# Patient Record
Sex: FEMALE | Race: BLACK OR AFRICAN AMERICAN | NOT HISPANIC OR LATINO
[De-identification: names, ages, dates, MRNs, and addresses within clinical notes are randomized per-mention and may not be internally consistent; named-entity substitution may affect disease eponyms.]

---

## 2017-01-04 ENCOUNTER — APPOINTMENT (OUTPATIENT)
Dept: BARIATRICS/WEIGHT MGMT | Facility: CLINIC | Age: 57
End: 2017-01-04

## 2017-01-17 ENCOUNTER — APPOINTMENT (OUTPATIENT)
Dept: BARIATRICS/WEIGHT MGMT | Facility: CLINIC | Age: 57
End: 2017-01-17

## 2017-01-30 ENCOUNTER — APPOINTMENT (OUTPATIENT)
Dept: OTOLARYNGOLOGY | Facility: CLINIC | Age: 57
End: 2017-01-30

## 2017-01-30 VITALS
DIASTOLIC BLOOD PRESSURE: 85 MMHG | WEIGHT: 293 LBS | HEIGHT: 64 IN | HEART RATE: 79 BPM | SYSTOLIC BLOOD PRESSURE: 133 MMHG | BODY MASS INDEX: 50.02 KG/M2

## 2017-01-30 DIAGNOSIS — H72.91 UNSPECIFIED PERFORATION OF TYMPANIC MEMBRANE, RIGHT EAR: ICD-10-CM

## 2017-01-30 DIAGNOSIS — Z86.79 PERSONAL HISTORY OF OTHER DISEASES OF THE CIRCULATORY SYSTEM: ICD-10-CM

## 2017-01-30 DIAGNOSIS — H90.2 CONDUCTIVE HEARING LOSS, UNSPECIFIED: ICD-10-CM

## 2017-01-30 DIAGNOSIS — Z86.39 PERSONAL HISTORY OF OTHER ENDOCRINE, NUTRITIONAL AND METABOLIC DISEASE: ICD-10-CM

## 2017-01-30 DIAGNOSIS — H72.2X1 OTHER MARGINAL PERFORATIONS OF TYMPANIC MEMBRANE, RIGHT EAR: ICD-10-CM

## 2017-01-31 PROBLEM — H90.2 CONDUCTIVE HEARING LOSS, TYMPANIC MEMBRANE: Status: ACTIVE | Noted: 2017-01-31

## 2017-01-31 PROBLEM — H72.2X1 MARGINAL PERFORATION OF TYMPANIC MEMBRANE OF RIGHT EAR: Status: ACTIVE | Noted: 2017-01-31

## 2017-02-27 ENCOUNTER — APPOINTMENT (OUTPATIENT)
Dept: OTOLARYNGOLOGY | Facility: CLINIC | Age: 57
End: 2017-02-27

## 2017-03-01 ENCOUNTER — APPOINTMENT (OUTPATIENT)
Dept: OTOLARYNGOLOGY | Facility: CLINIC | Age: 57
End: 2017-03-01

## 2017-03-08 ENCOUNTER — APPOINTMENT (OUTPATIENT)
Dept: OTOLARYNGOLOGY | Facility: CLINIC | Age: 57
End: 2017-03-08

## 2019-01-17 ENCOUNTER — OUTPATIENT (OUTPATIENT)
Dept: OUTPATIENT SERVICES | Facility: HOSPITAL | Age: 59
LOS: 1 days | End: 2019-01-17

## 2019-01-17 VITALS
OXYGEN SATURATION: 98 % | WEIGHT: 293 LBS | HEIGHT: 64 IN | DIASTOLIC BLOOD PRESSURE: 86 MMHG | TEMPERATURE: 97 F | RESPIRATION RATE: 14 BRPM | HEART RATE: 78 BPM | SYSTOLIC BLOOD PRESSURE: 138 MMHG

## 2019-01-17 DIAGNOSIS — I10 ESSENTIAL (PRIMARY) HYPERTENSION: ICD-10-CM

## 2019-01-17 DIAGNOSIS — N95.0 POSTMENOPAUSAL BLEEDING: ICD-10-CM

## 2019-01-17 DIAGNOSIS — R73.03 PREDIABETES: ICD-10-CM

## 2019-01-17 DIAGNOSIS — R06.83 SNORING: ICD-10-CM

## 2019-01-17 DIAGNOSIS — Z98.890 OTHER SPECIFIED POSTPROCEDURAL STATES: Chronic | ICD-10-CM

## 2019-01-17 LAB
ANION GAP SERPL CALC-SCNC: 13 MMO/L — SIGNIFICANT CHANGE UP (ref 7–14)
BUN SERPL-MCNC: 21 MG/DL — SIGNIFICANT CHANGE UP (ref 7–23)
CALCIUM SERPL-MCNC: 9.8 MG/DL — SIGNIFICANT CHANGE UP (ref 8.4–10.5)
CHLORIDE SERPL-SCNC: 96 MMOL/L — LOW (ref 98–107)
CO2 SERPL-SCNC: 30 MMOL/L — SIGNIFICANT CHANGE UP (ref 22–31)
CREAT SERPL-MCNC: 0.77 MG/DL — SIGNIFICANT CHANGE UP (ref 0.5–1.3)
GLUCOSE SERPL-MCNC: 89 MG/DL — SIGNIFICANT CHANGE UP (ref 70–99)
HBA1C BLD-MCNC: 5.8 % — HIGH (ref 4–5.6)
HCT VFR BLD CALC: 35.6 % — SIGNIFICANT CHANGE UP (ref 34.5–45)
HGB BLD-MCNC: 10.4 G/DL — LOW (ref 11.5–15.5)
MCHC RBC-ENTMCNC: 22.1 PG — LOW (ref 27–34)
MCHC RBC-ENTMCNC: 29.2 % — LOW (ref 32–36)
MCV RBC AUTO: 75.6 FL — LOW (ref 80–100)
NRBC # FLD: 0 K/UL — LOW (ref 25–125)
PLATELET # BLD AUTO: 264 K/UL — SIGNIFICANT CHANGE UP (ref 150–400)
PMV BLD: 10.8 FL — SIGNIFICANT CHANGE UP (ref 7–13)
POTASSIUM SERPL-MCNC: 3.7 MMOL/L — SIGNIFICANT CHANGE UP (ref 3.5–5.3)
POTASSIUM SERPL-SCNC: 3.7 MMOL/L — SIGNIFICANT CHANGE UP (ref 3.5–5.3)
RBC # BLD: 4.71 M/UL — SIGNIFICANT CHANGE UP (ref 3.8–5.2)
RBC # FLD: 18.1 % — HIGH (ref 10.3–14.5)
SODIUM SERPL-SCNC: 139 MMOL/L — SIGNIFICANT CHANGE UP (ref 135–145)
WBC # BLD: 7.74 K/UL — SIGNIFICANT CHANGE UP (ref 3.8–10.5)
WBC # FLD AUTO: 7.74 K/UL — SIGNIFICANT CHANGE UP (ref 3.8–10.5)

## 2019-01-17 RX ORDER — METFORMIN HYDROCHLORIDE 850 MG/1
1 TABLET ORAL
Qty: 0 | Refills: 0 | COMMUNITY

## 2019-01-17 RX ORDER — SODIUM CHLORIDE 9 MG/ML
1000 INJECTION, SOLUTION INTRAVENOUS
Qty: 0 | Refills: 0 | Status: DISCONTINUED | OUTPATIENT
Start: 2019-01-22 | End: 2019-02-06

## 2019-01-17 NOTE — H&P PST ADULT - RS GEN PE MLT RESP DETAILS PC
airway patent/respirations non-labored/no wheezes/breath sounds equal/no rales/clear to auscultation bilaterally/good air movement/no rhonchi

## 2019-01-17 NOTE — H&P PST ADULT - PROBLEM SELECTOR PLAN 3
Patient instructed to take Valsartan - Hydrochlorthiazide in the AM of surgery with a sip  of water.

## 2019-01-17 NOTE — H&P PST ADULT - NEGATIVE ENMT SYMPTOMS
no tinnitus/no vertigo/no sinus symptoms/no post-nasal discharge/no nose bleeds/no dysphagia/no ear pain/no nasal discharge/no nasal congestion/no nasal obstruction/no hearing difficulty/no recurrent cold sores/no abnormal taste sensation

## 2019-01-17 NOTE — H&P PST ADULT - NEGATIVE GENERAL GENITOURINARY SYMPTOMS
no flank pain R/no bladder infections/no incontinence/normal urinary frequency/no nocturia/no urine discoloration/no hematuria/no flank pain L/no dysuria/no urinary hesitancy

## 2019-01-17 NOTE — H&P PST ADULT - FAMILY HISTORY
Father  Still living? Unknown  Family history of hypertension, Age at diagnosis: Age Unknown  Family history of diabetes mellitus (DM), Age at diagnosis: Age Unknown     Mother  Still living? Yes, Estimated age: Age Unknown  Family history of hypertension, Age at diagnosis: Age Unknown  Family history of diabetes mellitus (DM), Age at diagnosis: Age Unknown

## 2019-01-17 NOTE — H&P PST ADULT - NEGATIVE ALLERGY TYPES
no reactions to food/no reactions to insect bites/no outdoor environmental allergies/no indoor environmental allergies/no reactions to medicines/no reactions to animals

## 2019-01-17 NOTE — H&P PST ADULT - HISTORY OF PRESENT ILLNESS
58 year old female with a history of Postmenopausal bleeding. Patient was referred to Dr. Robert for an evaluation. Pre op diagnosis: Postmenopausal bleeding. Patient is scheduled for Dilation curettage hysteroscopy scheduled on 1/22/2019.

## 2019-01-17 NOTE — H&P PST ADULT - NEGATIVE NEUROLOGICAL SYMPTOMS
no syncope/no loss of sensation/no difficulty walking/no loss of consciousness/no focal seizures/no headache/no transient paralysis/no paresthesias/no weakness/no generalized seizures/no tremors/no vertigo

## 2019-01-17 NOTE — H&P PST ADULT - NEGATIVE OPHTHALMOLOGIC SYMPTOMS
no discharge R/no blurred vision L/no blurred vision R/no diplopia/no irritation R/no pain R/no photophobia/no discharge L/no pain L/no irritation L

## 2019-01-17 NOTE — H&P PST ADULT - NEGATIVE GENERAL SYMPTOMS
no fever/no fatigue/no weight gain/no polyphagia/no polyuria/no chills/no sweating/no anorexia/no weight loss

## 2019-01-17 NOTE — H&P PST ADULT - NEGATIVE MUSCULOSKELETAL SYMPTOMS
no arm pain L/no arm pain R/no back pain/no leg pain L/no muscle cramps/no neck pain/no muscle weakness

## 2019-01-17 NOTE — H&P PST ADULT - PROBLEM SELECTOR PLAN 1
Patient is scheduled for Dilation curettage hysteroscopy scheduled on 1/22/2019.       Preop instructions, pepcid, surgical scrub provided. Pt stated understanding.    Pending labs. Patient is scheduled for Dilation curettage hysteroscopy scheduled on 1/22/2019.       Preop instructions, pepcid, surgical scrub provided. Pt stated understanding.  Pending Medical Evaluation per surgeon and PST- abnormal EKG -Dr. Ravinder Bagley - [phone]-[phone].    Pending labs.

## 2019-01-17 NOTE — H&P PST ADULT - ACTIVITY
Walking, climbing up and down stairs, gym 3 times a week for 60 minutes with a , cardio, weight lifting, house chores, ADLs, shopping

## 2019-01-17 NOTE — H&P PST ADULT - NEGATIVE GASTROINTESTINAL SYMPTOMS
no abdominal pain/no constipation/no melena/no diarrhea/no nausea/no vomiting/no change in bowel habits

## 2019-01-17 NOTE — H&P PST ADULT - ASSESSMENT
Pre op diagnosis: Postmenopausal bleeding. Patient is scheduled for Dilation curettage hysteroscopy scheduled on 1/22/2019.

## 2019-01-21 ENCOUNTER — TRANSCRIPTION ENCOUNTER (OUTPATIENT)
Age: 59
End: 2019-01-21

## 2019-01-22 ENCOUNTER — RESULT REVIEW (OUTPATIENT)
Age: 59
End: 2019-01-22

## 2019-01-22 ENCOUNTER — OUTPATIENT (OUTPATIENT)
Dept: OUTPATIENT SERVICES | Facility: HOSPITAL | Age: 59
LOS: 1 days | Discharge: HOME | End: 2019-01-22
Payer: COMMERCIAL

## 2019-01-22 VITALS
HEART RATE: 81 BPM | SYSTOLIC BLOOD PRESSURE: 155 MMHG | DIASTOLIC BLOOD PRESSURE: 68 MMHG | RESPIRATION RATE: 18 BRPM | WEIGHT: 293 LBS | TEMPERATURE: 98 F | HEIGHT: 64 IN | OXYGEN SATURATION: 96 %

## 2019-01-22 VITALS
OXYGEN SATURATION: 100 % | SYSTOLIC BLOOD PRESSURE: 155 MMHG | TEMPERATURE: 97 F | HEART RATE: 72 BPM | DIASTOLIC BLOOD PRESSURE: 61 MMHG | RESPIRATION RATE: 14 BRPM

## 2019-01-22 DIAGNOSIS — N95.0 POSTMENOPAUSAL BLEEDING: ICD-10-CM

## 2019-01-22 DIAGNOSIS — Z98.890 OTHER SPECIFIED POSTPROCEDURAL STATES: Chronic | ICD-10-CM

## 2019-01-22 LAB — GLUCOSE BLDC GLUCOMTR-MCNC: 117 MG/DL — HIGH (ref 70–99)

## 2019-01-22 PROCEDURE — 88305 TISSUE EXAM BY PATHOLOGIST: CPT | Mod: 26

## 2019-01-22 RX ORDER — SODIUM CHLORIDE 9 MG/ML
1000 INJECTION, SOLUTION INTRAVENOUS
Qty: 0 | Refills: 0 | Status: DISCONTINUED | OUTPATIENT
Start: 2019-01-22 | End: 2019-01-22

## 2019-01-22 RX ORDER — FENTANYL CITRATE 50 UG/ML
25 INJECTION INTRAVENOUS
Qty: 0 | Refills: 0 | Status: DISCONTINUED | OUTPATIENT
Start: 2019-01-22 | End: 2019-01-22

## 2019-01-22 RX ORDER — FENTANYL CITRATE 50 UG/ML
50 INJECTION INTRAVENOUS
Qty: 0 | Refills: 0 | Status: DISCONTINUED | OUTPATIENT
Start: 2019-01-22 | End: 2019-01-22

## 2019-01-22 RX ORDER — SODIUM CHLORIDE 9 MG/ML
1000 INJECTION, SOLUTION INTRAVENOUS
Qty: 0 | Refills: 0 | Status: DISCONTINUED | OUTPATIENT
Start: 2019-01-22 | End: 2019-02-06

## 2019-01-22 RX ORDER — ONDANSETRON 8 MG/1
4 TABLET, FILM COATED ORAL ONCE
Qty: 0 | Refills: 0 | Status: COMPLETED | OUTPATIENT
Start: 2019-01-22 | End: 2019-01-22

## 2019-01-22 RX ORDER — OXYCODONE HYDROCHLORIDE 5 MG/1
10 TABLET ORAL ONCE
Qty: 0 | Refills: 0 | Status: DISCONTINUED | OUTPATIENT
Start: 2019-01-22 | End: 2019-01-22

## 2019-01-22 RX ORDER — OXYCODONE HYDROCHLORIDE 5 MG/1
5 TABLET ORAL ONCE
Qty: 0 | Refills: 0 | Status: DISCONTINUED | OUTPATIENT
Start: 2019-01-22 | End: 2019-01-22

## 2019-01-22 RX ADMIN — SODIUM CHLORIDE 150 MILLILITER(S): 9 INJECTION, SOLUTION INTRAVENOUS at 09:19

## 2019-01-22 RX ADMIN — ONDANSETRON 4 MILLIGRAM(S): 8 TABLET, FILM COATED ORAL at 09:45

## 2019-01-22 NOTE — ASU DISCHARGE PLAN (ADULT/PEDIATRIC). - MEDICATION SUMMARY - MEDICATIONS TO TAKE
I will START or STAY ON the medications listed below when I get home from the hospital:    Motrin 600 mg oral tablet  -- 1 tab(s) by mouth every 6 hours, As Needed  -- Indication: For for pain/cramping, as needed    Tylenol 325 mg oral tablet  -- 3 tab(s) by mouth every 6 hours, As Needed  -- Indication: For for pain/cramping, as needed    metFORMIN 500 mg oral tablet  -- 1 tab(s) by mouth ( Patient states she does not take daily)   -- Indication: For home med    valsartan-hydrochlorothiazide 160mg-25mg oral tablet  -- 1 tab(s) by mouth once a day  -- Indication: For home med

## 2019-01-22 NOTE — PROGRESS NOTE ADULT - SUBJECTIVE AND OBJECTIVE BOX
Addenum to aneshtesia record:     For induction, patient got 300 mg propofol IV and 150 succinylcholine for weight of 320 lbs.

## 2019-01-22 NOTE — ASU DISCHARGE PLAN (ADULT/PEDIATRIC). - NOTIFY
Persistent Nausea and Vomiting/Bleeding that does not stop/GYN Fever>100.4/Inability to Tolerate Liquids or Foods/Pain not relieved by Medications

## 2019-01-22 NOTE — BRIEF OPERATIVE NOTE - OPERATION/FINDINGS
anteverted uterus, submucosal myoma on anterior uterine wall, uterine polyp on anterior left uterine wall

## 2019-01-22 NOTE — ASU DISCHARGE PLAN (ADULT/PEDIATRIC). - ITEMS TO FOLLOWUP WITH YOUR PHYSICIAN'S
Take Motrin and Tylenol as needed for pain and cramping.  Vaginal spotting for the next couple of days is normal.  If heavy vaginal bleeding, foul smelling discharge, fevers, or pain not controlled with oral pain meds, please contact your provider or go to the emergency room.  Nothing in the vagina for the next two weeks. Please call Dr. Robert's office for a 2 week follow-up.

## 2019-01-22 NOTE — ASU DISCHARGE PLAN (ADULT/PEDIATRIC). - NURSING INSTRUCTIONS
You received intravenous toradol (NSAID) in the operating room at 8:45am You may take additional NSAIDs (advil/ibuprofen/motrin) after 2:45pm.

## 2019-01-22 NOTE — BRIEF OPERATIVE NOTE - PROCEDURE
<<-----Click on this checkbox to enter Procedure Diagnostic hysteroscopy  01/22/2019    Active  [id number]  Dilation and curettage  01/22/2019    Active  [id number]

## 2019-01-24 LAB — SURGICAL PATHOLOGY STUDY: SIGNIFICANT CHANGE UP

## 2019-05-03 ENCOUNTER — RESULT REVIEW (OUTPATIENT)
Age: 59
End: 2019-05-03

## 2019-06-21 ENCOUNTER — OUTPATIENT (OUTPATIENT)
Dept: OUTPATIENT SERVICES | Facility: HOSPITAL | Age: 59
LOS: 1 days | End: 2019-06-21
Payer: COMMERCIAL

## 2019-06-21 VITALS
HEART RATE: 66 BPM | WEIGHT: 293 LBS | DIASTOLIC BLOOD PRESSURE: 88 MMHG | HEIGHT: 64 IN | RESPIRATION RATE: 16 BRPM | SYSTOLIC BLOOD PRESSURE: 110 MMHG | TEMPERATURE: 97 F | OXYGEN SATURATION: 96 %

## 2019-06-21 DIAGNOSIS — N95.0 POSTMENOPAUSAL BLEEDING: ICD-10-CM

## 2019-06-21 DIAGNOSIS — R06.83 SNORING: ICD-10-CM

## 2019-06-21 DIAGNOSIS — N95.0 POSTMENOPAUSAL BLEEDING: Chronic | ICD-10-CM

## 2019-06-21 DIAGNOSIS — Z98.890 OTHER SPECIFIED POSTPROCEDURAL STATES: Chronic | ICD-10-CM

## 2019-06-21 DIAGNOSIS — R73.03 PREDIABETES: ICD-10-CM

## 2019-06-21 LAB
ANION GAP SERPL CALC-SCNC: 15 MMO/L — HIGH (ref 7–14)
BUN SERPL-MCNC: 8 MG/DL — SIGNIFICANT CHANGE UP (ref 7–23)
CALCIUM SERPL-MCNC: 9.2 MG/DL — SIGNIFICANT CHANGE UP (ref 8.4–10.5)
CHLORIDE SERPL-SCNC: 101 MMOL/L — SIGNIFICANT CHANGE UP (ref 98–107)
CO2 SERPL-SCNC: 24 MMOL/L — SIGNIFICANT CHANGE UP (ref 22–31)
CREAT SERPL-MCNC: 0.61 MG/DL — SIGNIFICANT CHANGE UP (ref 0.5–1.3)
GLUCOSE SERPL-MCNC: 82 MG/DL — SIGNIFICANT CHANGE UP (ref 70–99)
HBA1C BLD-MCNC: 6.2 % — HIGH (ref 4–5.6)
HCT VFR BLD CALC: 35 % — SIGNIFICANT CHANGE UP (ref 34.5–45)
HGB BLD-MCNC: 10.2 G/DL — LOW (ref 11.5–15.5)
MCHC RBC-ENTMCNC: 23.3 PG — LOW (ref 27–34)
MCHC RBC-ENTMCNC: 29.1 % — LOW (ref 32–36)
MCV RBC AUTO: 79.9 FL — LOW (ref 80–100)
NRBC # FLD: 0 K/UL — SIGNIFICANT CHANGE UP (ref 0–0)
PLATELET # BLD AUTO: 230 K/UL — SIGNIFICANT CHANGE UP (ref 150–400)
PMV BLD: 10.8 FL — SIGNIFICANT CHANGE UP (ref 7–13)
POTASSIUM SERPL-MCNC: 4.2 MMOL/L — SIGNIFICANT CHANGE UP (ref 3.5–5.3)
POTASSIUM SERPL-SCNC: 4.2 MMOL/L — SIGNIFICANT CHANGE UP (ref 3.5–5.3)
RBC # BLD: 4.38 M/UL — SIGNIFICANT CHANGE UP (ref 3.8–5.2)
RBC # FLD: 18.7 % — HIGH (ref 10.3–14.5)
SODIUM SERPL-SCNC: 140 MMOL/L — SIGNIFICANT CHANGE UP (ref 135–145)
WBC # BLD: 6.87 K/UL — SIGNIFICANT CHANGE UP (ref 3.8–10.5)
WBC # FLD AUTO: 6.87 K/UL — SIGNIFICANT CHANGE UP (ref 3.8–10.5)

## 2019-06-21 PROCEDURE — 93010 ELECTROCARDIOGRAM REPORT: CPT

## 2019-06-21 RX ORDER — SODIUM CHLORIDE 9 MG/ML
1000 INJECTION, SOLUTION INTRAVENOUS
Refills: 0 | Status: DISCONTINUED | OUTPATIENT
Start: 2019-07-01 | End: 2019-07-16

## 2019-06-21 RX ORDER — IBUPROFEN 200 MG
1 TABLET ORAL
Qty: 0 | Refills: 0 | DISCHARGE

## 2019-06-21 RX ORDER — METFORMIN HYDROCHLORIDE 850 MG/1
1 TABLET ORAL
Qty: 0 | Refills: 0 | DISCHARGE

## 2019-06-21 RX ORDER — ACETAMINOPHEN 500 MG
3 TABLET ORAL
Qty: 0 | Refills: 0 | DISCHARGE

## 2019-06-21 NOTE — H&P PST ADULT - NSICDXPASTMEDICALHX_GEN_ALL_CORE_FT
PAST MEDICAL HISTORY:  Hypertension     Obesity     Postmenopausal bleeding D&C in   Jan. 2019    Prediabetes PAST MEDICAL HISTORY:  Hearing loss ruptured right tympanic membrane    Hypertension     Obesity     Postmenopausal bleeding D&C in   Jan. 2019    Prediabetes     Snoring vanna precautions -- responds affirmatively to STOP BANG questionnaire

## 2019-06-21 NOTE — H&P PST ADULT - ACTIVITY
Walking, climbing up and down stairs, gym 3 times a week for 60 minutes with a , cardio, weight lifting, house chores, ADLs, shopping can climb stairs without SOB

## 2019-06-21 NOTE — H&P PST ADULT - ENMT COMMENTS
ruptured right eardrum with slight decreased hearing known right ruptured ear drum known right ruptured ear drum  with decreased hearing

## 2019-06-21 NOTE — H&P PST ADULT - NSICDXPROBLEM_GEN_ALL_CORE_FT
PROBLEM DIAGNOSES  Problem: Postmenopausal bleeding  Assessment and Plan: This is a  59 y/o female who is scheduled for D&C, hysteroscopy on 7-1-19  * Given preop instructions    Problem: Snoring  Assessment and Plan: Notified OR booking via fax of vanna precautions     Problem: Prediabetes  Assessment and Plan: Instructed to stop Metformin 24 hours before surgery PROBLEM DIAGNOSES  Problem: Postmenopausal bleeding  Assessment and Plan: This is a  57 y/o female who is scheduled for D&C, hysteroscopy on 7-1-19  * Given preop instructions  * Instructed to take normal am dose of losartan - HCTZ, and medroxyprogesterone the am of surgery    Problem: Snoring  Assessment and Plan: Notified OR booking via fax of vanna precautions     Problem: Prediabetes  Assessment and Plan: Instructed to stop Metformin 24 hours before surgery PROBLEM DIAGNOSES  Problem: Postmenopausal bleeding  Assessment and Plan: This is a  59 y/o female who is scheduled for D&C, hysteroscopy on 7-1-19  * Given preop instructions  * Instructed to take normal am dose of losartan - HCTZ, and medroxyprogesterone the am of surgery    Problem: Snoring  Assessment and Plan: Notified OR booking via fax of vanna precautions     Problem: Prediabetes  Assessment and Plan: Instructed to stop Metformin 24 hours before surgery  * Comparison ekg on chart  * Await stress test and echo from pcp

## 2019-06-21 NOTE — H&P PST ADULT - HISTORY OF PRESENT ILLNESS
This is a 58 year old female with a history of Postmenopausal bleeding. Patient underwent.  Dilation curettage hysteroscopy scheduled on 1/22/2019. Pathology revealed abnormal cells, as well as  a polyp. Placed on hormonal therapy with subsequent benign endometrial biopsies. Postmenopausal bleeding continued. Intervention recommended. Scheduled for D&C, hysteroscopy on 7-1-19

## 2019-06-21 NOTE — H&P PST ADULT - NSICDXPASTSURGICALHX_GEN_ALL_CORE_FT
PAST SURGICAL HISTORY:  History of surgery Left knee surgery in 2007    Postmenopausal bleeding PAST SURGICAL HISTORY:  History of elective      History of surgery Left knee surgery in 2007    Postmenopausal bleeding

## 2019-06-21 NOTE — H&P PST ADULT - NSICDXFAMILYHX_GEN_ALL_CORE_FT
FAMILY HISTORY:  Father  Still living? Unknown  Family history of diabetes mellitus (DM), Age at diagnosis: Age Unknown  Family history of hypertension, Age at diagnosis: Age Unknown    Mother  Still living? Yes, Estimated age: Age Unknown  Family history of diabetes mellitus (DM), Age at diagnosis: Age Unknown  Family history of hypertension, Age at diagnosis: Age Unknown

## 2019-06-28 NOTE — ASU PATIENT PROFILE, ADULT - VISION (WITH CORRECTIVE LENSES IF THE PATIENT USUALLY WEARS THEM):
Partially impaired: cannot see medication labels or newsprint, but can see obstacles in path, and the surrounding layout; can count fingers at arm's length Partially impaired: cannot see medication labels or newsprint, but can see obstacles in path, and the surrounding layout; can count fingers at arm's length/glasses

## 2019-06-28 NOTE — ASU PATIENT PROFILE, ADULT - PMH
Hearing loss  ruptured right tympanic membrane  Hypertension    Obesity    Postmenopausal bleeding  D&C in   Jan. 2019  Prediabetes    Snoring  vanna precautions -- responds affirmatively to STOP BANG questionnaire

## 2019-06-30 ENCOUNTER — TRANSCRIPTION ENCOUNTER (OUTPATIENT)
Age: 59
End: 2019-06-30

## 2019-07-01 ENCOUNTER — RESULT REVIEW (OUTPATIENT)
Age: 59
End: 2019-07-01

## 2019-07-01 ENCOUNTER — OUTPATIENT (OUTPATIENT)
Dept: OUTPATIENT SERVICES | Facility: HOSPITAL | Age: 59
LOS: 1 days | Discharge: HOME | End: 2019-07-01
Payer: COMMERCIAL

## 2019-07-01 VITALS
RESPIRATION RATE: 17 BRPM | DIASTOLIC BLOOD PRESSURE: 69 MMHG | OXYGEN SATURATION: 98 % | SYSTOLIC BLOOD PRESSURE: 162 MMHG | HEIGHT: 64 IN | HEART RATE: 66 BPM | TEMPERATURE: 98 F | WEIGHT: 293 LBS

## 2019-07-01 VITALS
SYSTOLIC BLOOD PRESSURE: 156 MMHG | RESPIRATION RATE: 16 BRPM | TEMPERATURE: 98 F | DIASTOLIC BLOOD PRESSURE: 68 MMHG | OXYGEN SATURATION: 100 % | HEART RATE: 66 BPM

## 2019-07-01 DIAGNOSIS — Z98.890 OTHER SPECIFIED POSTPROCEDURAL STATES: Chronic | ICD-10-CM

## 2019-07-01 DIAGNOSIS — N95.0 POSTMENOPAUSAL BLEEDING: Chronic | ICD-10-CM

## 2019-07-01 DIAGNOSIS — N95.0 POSTMENOPAUSAL BLEEDING: ICD-10-CM

## 2019-07-01 LAB — GLUCOSE BLDC GLUCOMTR-MCNC: 92 MG/DL — SIGNIFICANT CHANGE UP (ref 70–99)

## 2019-07-01 PROCEDURE — 88305 TISSUE EXAM BY PATHOLOGIST: CPT | Mod: 26

## 2019-07-01 RX ORDER — FENTANYL CITRATE 50 UG/ML
25 INJECTION INTRAVENOUS
Refills: 0 | Status: DISCONTINUED | OUTPATIENT
Start: 2019-07-01 | End: 2019-07-01

## 2019-07-01 RX ORDER — FENTANYL CITRATE 50 UG/ML
50 INJECTION INTRAVENOUS
Refills: 0 | Status: DISCONTINUED | OUTPATIENT
Start: 2019-07-01 | End: 2019-07-01

## 2019-07-01 RX ORDER — ONDANSETRON 8 MG/1
4 TABLET, FILM COATED ORAL ONCE
Refills: 0 | Status: DISCONTINUED | OUTPATIENT
Start: 2019-07-01 | End: 2019-07-16

## 2019-07-01 NOTE — BRIEF OPERATIVE NOTE - NSICDXBRIEFPROCEDURE_GEN_ALL_CORE_FT
PROCEDURES:  D&C (dilatation and curettage, scraping of uterus) 01-Jul-2019 11:35:50  Deepak Ragsdale

## 2019-07-01 NOTE — ASU DISCHARGE PLAN (ADULT/PEDIATRIC) - CARE PROVIDER_API CALL
Rosario Robert)  Obstetrics and Gynecology  [address], [address]  [place] [address]  Phone: [phone]  Fax: [phone]  Follow Up Time:

## 2019-07-01 NOTE — ASU DISCHARGE PLAN (ADULT/PEDIATRIC) - CALL YOUR DOCTOR IF YOU HAVE ANY OF THE FOLLOWING:
Swelling that gets worse/Unable to urinate/Increased irritability or sluggishness/Nausea and vomiting that does not stop/Bleeding that does not stop/Wound/Surgical Site with redness, or foul smelling discharge or pus/Fever greater than (need to indicate Fahrenheit or Celsius)/Numbness, tingling, color or temperature change to extremity/Pain not relieved by Medications/Inability to tolerate liquids or foods/Excessive diarrhea

## 2019-07-01 NOTE — ASU DISCHARGE PLAN (ADULT/PEDIATRIC) - NURSING INSTRUCTIONS
No heavy lifting or straining. If excessive bleeding that does not stop, notify MD. If fever over 100.4 notify MD. Follow up with MD. DO NOT take any Tylenol (Acetaminophen) or narcotics containing Tylenol until after  ___525 pm___ . You received Tylenol during your operation and it can cause damage to your liver if too much is taken within a 24 hour time period. No motrin before 6:30 pm

## 2019-09-18 ENCOUNTER — RESULT REVIEW (OUTPATIENT)
Age: 59
End: 2019-09-18

## 2019-09-25 PROBLEM — E66.9 OBESITY, UNSPECIFIED: Chronic | Status: ACTIVE | Noted: 2019-06-21

## 2019-10-03 ENCOUNTER — APPOINTMENT (OUTPATIENT)
Dept: OBGYN | Facility: CLINIC | Age: 59
End: 2019-10-03
Payer: COMMERCIAL

## 2019-10-03 PROCEDURE — 99243 OFF/OP CNSLTJ NEW/EST LOW 30: CPT

## 2019-10-04 ENCOUNTER — OUTPATIENT (OUTPATIENT)
Dept: OUTPATIENT SERVICES | Facility: HOSPITAL | Age: 59
LOS: 1 days | End: 2019-10-04
Payer: COMMERCIAL

## 2019-10-04 ENCOUNTER — APPOINTMENT (OUTPATIENT)
Dept: ULTRASOUND IMAGING | Facility: CLINIC | Age: 59
End: 2019-10-04
Payer: COMMERCIAL

## 2019-10-04 DIAGNOSIS — Z98.890 OTHER SPECIFIED POSTPROCEDURAL STATES: Chronic | ICD-10-CM

## 2019-10-04 DIAGNOSIS — Z00.8 ENCOUNTER FOR OTHER GENERAL EXAMINATION: ICD-10-CM

## 2019-10-04 DIAGNOSIS — N95.0 POSTMENOPAUSAL BLEEDING: Chronic | ICD-10-CM

## 2019-10-04 PROCEDURE — 76856 US EXAM PELVIC COMPLETE: CPT

## 2019-10-04 PROCEDURE — 76830 TRANSVAGINAL US NON-OB: CPT

## 2019-10-04 PROCEDURE — 76830 TRANSVAGINAL US NON-OB: CPT | Mod: 26

## 2019-10-04 PROCEDURE — 76856 US EXAM PELVIC COMPLETE: CPT | Mod: 26,59

## 2019-10-15 ENCOUNTER — TRANSCRIPTION ENCOUNTER (OUTPATIENT)
Age: 59
End: 2019-10-15

## 2019-11-11 ENCOUNTER — OUTPATIENT (OUTPATIENT)
Dept: OUTPATIENT SERVICES | Facility: HOSPITAL | Age: 59
LOS: 1 days | End: 2019-11-11
Payer: COMMERCIAL

## 2019-11-11 VITALS
TEMPERATURE: 98 F | DIASTOLIC BLOOD PRESSURE: 90 MMHG | WEIGHT: 293 LBS | RESPIRATION RATE: 16 BRPM | HEIGHT: 63.25 IN | SYSTOLIC BLOOD PRESSURE: 150 MMHG | HEART RATE: 67 BPM | OXYGEN SATURATION: 98 %

## 2019-11-11 DIAGNOSIS — G47.33 OBSTRUCTIVE SLEEP APNEA (ADULT) (PEDIATRIC): ICD-10-CM

## 2019-11-11 DIAGNOSIS — Z98.890 OTHER SPECIFIED POSTPROCEDURAL STATES: Chronic | ICD-10-CM

## 2019-11-11 DIAGNOSIS — N95.0 POSTMENOPAUSAL BLEEDING: ICD-10-CM

## 2019-11-11 DIAGNOSIS — N95.0 POSTMENOPAUSAL BLEEDING: Chronic | ICD-10-CM

## 2019-11-11 DIAGNOSIS — I10 ESSENTIAL (PRIMARY) HYPERTENSION: ICD-10-CM

## 2019-11-11 DIAGNOSIS — Z01.818 ENCOUNTER FOR OTHER PREPROCEDURAL EXAMINATION: ICD-10-CM

## 2019-11-11 DIAGNOSIS — E11.9 TYPE 2 DIABETES MELLITUS WITHOUT COMPLICATIONS: ICD-10-CM

## 2019-11-11 LAB
ANION GAP SERPL CALC-SCNC: 15 MMO/L — HIGH (ref 7–14)
BLD GP AB SCN SERPL QL: NEGATIVE — SIGNIFICANT CHANGE UP
BUN SERPL-MCNC: 8 MG/DL — SIGNIFICANT CHANGE UP (ref 7–23)
CALCIUM SERPL-MCNC: 9.7 MG/DL — SIGNIFICANT CHANGE UP (ref 8.4–10.5)
CHLORIDE SERPL-SCNC: 96 MMOL/L — LOW (ref 98–107)
CO2 SERPL-SCNC: 28 MMOL/L — SIGNIFICANT CHANGE UP (ref 22–31)
CREAT SERPL-MCNC: 0.71 MG/DL — SIGNIFICANT CHANGE UP (ref 0.5–1.3)
GLUCOSE SERPL-MCNC: 104 MG/DL — HIGH (ref 70–99)
HBA1C BLD-MCNC: 6.1 % — HIGH (ref 4–5.6)
HCT VFR BLD CALC: 38 % — SIGNIFICANT CHANGE UP (ref 34.5–45)
HGB BLD-MCNC: 11.4 G/DL — LOW (ref 11.5–15.5)
MCHC RBC-ENTMCNC: 24 PG — LOW (ref 27–34)
MCHC RBC-ENTMCNC: 30 % — LOW (ref 32–36)
MCV RBC AUTO: 80 FL — SIGNIFICANT CHANGE UP (ref 80–100)
NRBC # FLD: 0 K/UL — SIGNIFICANT CHANGE UP (ref 0–0)
PLATELET # BLD AUTO: 255 K/UL — SIGNIFICANT CHANGE UP (ref 150–400)
PMV BLD: 11.3 FL — SIGNIFICANT CHANGE UP (ref 7–13)
POTASSIUM SERPL-MCNC: 3.7 MMOL/L — SIGNIFICANT CHANGE UP (ref 3.5–5.3)
POTASSIUM SERPL-SCNC: 3.7 MMOL/L — SIGNIFICANT CHANGE UP (ref 3.5–5.3)
RBC # BLD: 4.75 M/UL — SIGNIFICANT CHANGE UP (ref 3.8–5.2)
RBC # FLD: 16 % — HIGH (ref 10.3–14.5)
RH IG SCN BLD-IMP: POSITIVE — SIGNIFICANT CHANGE UP
SODIUM SERPL-SCNC: 139 MMOL/L — SIGNIFICANT CHANGE UP (ref 135–145)
WBC # BLD: 6.24 K/UL — SIGNIFICANT CHANGE UP (ref 3.8–10.5)
WBC # FLD AUTO: 6.24 K/UL — SIGNIFICANT CHANGE UP (ref 3.8–10.5)

## 2019-11-11 PROCEDURE — 93010 ELECTROCARDIOGRAM REPORT: CPT

## 2019-11-11 RX ORDER — MEDROXYPROGESTERONE ACETATE 150 MG/ML
1 INJECTION, SUSPENSION, EXTENDED RELEASE INTRAMUSCULAR
Qty: 0 | Refills: 0 | DISCHARGE

## 2019-11-11 NOTE — H&P PST ADULT - NEGATIVE NEUROLOGICAL SYMPTOMS
no paresthesias/no focal seizures/no generalized seizures/no transient paralysis/no weakness/no headache

## 2019-11-11 NOTE — H&P PST ADULT - NSANTHOSAYNRD_GEN_A_CORE
No. SARAH screening performed.  STOP BANG Legend: 0-2 = LOW Risk; 3-4 = INTERMEDIATE Risk; 5-8 = HIGH Risk/pt repots h/o snoring pt denies at present pt reports h/o snoring pt denies at present/No. SARAH screening performed.  STOP BANG Legend: 0-2 = LOW Risk; 3-4 = INTERMEDIATE Risk; 5-8 = HIGH Risk

## 2019-11-11 NOTE — H&P PST ADULT - ATTENDING COMMENTS
pt with PMB and prior hyperplasia and s/p benign d/c for tlh bso the risks and alternatives have been discussed.

## 2019-11-11 NOTE — H&P PST ADULT - NSICDXPROBLEM_GEN_ALL_CORE_FT
PROBLEM DIAGNOSES  Problem: Postmenopausal bleeding  Assessment and Plan: Robotic Assisted Laparoscopic Hysterectomy, Bilateral Salpingo OOpherectomy  Possible Exploratory Laparotomy   Pre op instructions including Pepcid and Hibiclens with teach back eviewed with pt ; pt verbalized good understanding of pre op instructions  Pt to Dr Bagley for pre op medical evaluation       Problem: Hypertension  Assessment and Plan: Pt to take Valsartan dos  Pt to see Dr Bagley pre op for Evaluation of Blood pressure and medical Clearance      Problem: Diabetes mellitus  Assessment and Plan: BMP and HGBA1C done 11/11/19   Pt to hold Metfromin evening prior to surgery and dos  FS dos     Problem: SARAH (obstructive sleep apnea)  Assessment and Plan: SARAH Precautions  OR booking notified via fax PROBLEM DIAGNOSES  Problem: Postmenopausal bleeding  Assessment and Plan: Robotic Assisted Laparoscopic Hysterectomy, Bilateral Salpingo OOpherectomy  Possible Exploratory Laparotomy   Pre op instructions including Pepcid and Hibiclens with teach back reviewed with pt ; pt verbalized good understanding of pre op instructions  Pt to Dr Bagley for pre op medical evaluation       Problem: Hypertension  Assessment and Plan: Pt to take Valsartan dos  Pt to see Dr Bagley pre op for Evaluation of Blood pressure and medical Clearance      Problem: Diabetes mellitus  Assessment and Plan: BMP and HGBA1C done 11/11/19   Pt to hold Metfromin evening prior to surgery and dos  FS dos     Problem: SARAH (obstructive sleep apnea)  Assessment and Plan: SARAH Precautions  OR booking notified via fax

## 2019-11-11 NOTE — H&P PST ADULT - NSICDXPASTSURGICALHX_GEN_ALL_CORE_FT
PAST SURGICAL HISTORY:  History of elective      History of surgery Left knee surgery in     Postmenopausal bleeding s/p D&C Hysteroscopy ,

## 2019-11-11 NOTE — H&P PST ADULT - NSICDXPASTMEDICALHX_GEN_ALL_CORE_FT
PAST MEDICAL HISTORY:  Hearing loss ruptured right tympanic membrane    Hypertension     Obesity     Postmenopausal bleeding D&C in   Jan. 2019, June 2019    Prediabetes     Snoring vanna precautions -- responds affirmatively to STOP BANG questionnaire

## 2019-11-11 NOTE — H&P PST ADULT - NSANTHAGERD_ENT_A_CORE
PPH Schizoaffective Disorder & Cannabis Use Disorder. He has a history of multiple inpatient admissions discharged most recently from [facility] 8/24/18-10/11/18 and [facility] inpatient 11/17/17-8/8/18. . Patient was inpatient [facility] stay in 2015 until 10/25/17 but then was discharged and readmitted after medication non compliance until 8/8/18. He is currently on AOT. Yes

## 2019-11-11 NOTE — H&P PST ADULT - HISTORY OF PRESENT ILLNESS
This is a 58 year old female with a history of Postmenopausal bleeding. Patient s/p  Dilation curettage hysteroscopy scheduled on 1/22/2019. Pathology revealed abnormal cells, as well as  a polyp. Pt states  postmenopausal bleeding continued, pt s/p   D&C, hysteroscopy on 7-1-19. Pt reports reoccurrence of bleeding ; pt f/u with surgeon ; pt now presents for Robotic Assisted laparoscopic Hysterectomy  Bilateral Salpingo Oophorectomy Possible Exploratory laparotomy

## 2019-11-11 NOTE — H&P PST ADULT - ENMT COMMENTS
ruptured right eardrum with slight decreased hearing known right ruptured ear drum  with decreased hearing

## 2019-11-26 ENCOUNTER — TRANSCRIPTION ENCOUNTER (OUTPATIENT)
Age: 59
End: 2019-11-26

## 2019-11-26 NOTE — ASU PATIENT PROFILE, ADULT - PMH
Hearing loss  ruptured right tympanic membrane  Hypertension    Obesity    Postmenopausal bleeding  D&C in   Jan. 2019, June 2019  Prediabetes    Snoring  vanna precautions -- responds affirmatively to STOP BANG questionnaire

## 2019-11-26 NOTE — ASU PATIENT PROFILE, ADULT - PSH
History of elective     History of surgery  Left knee surgery in   Postmenopausal bleeding  s/p D&C Hysteroscopy ,

## 2019-11-27 ENCOUNTER — INPATIENT (INPATIENT)
Facility: HOSPITAL | Age: 59
LOS: 0 days | Discharge: HOME | End: 2019-11-28
Attending: OBSTETRICS & GYNECOLOGY | Admitting: OBSTETRICS & GYNECOLOGY
Payer: COMMERCIAL

## 2019-11-27 ENCOUNTER — APPOINTMENT (OUTPATIENT)
Dept: OBGYN | Facility: HOSPITAL | Age: 59
End: 2019-11-27

## 2019-11-27 ENCOUNTER — TRANSCRIPTION ENCOUNTER (OUTPATIENT)
Age: 59
End: 2019-11-27

## 2019-11-27 ENCOUNTER — RESULT REVIEW (OUTPATIENT)
Age: 59
End: 2019-11-27

## 2019-11-27 VITALS
OXYGEN SATURATION: 98 % | DIASTOLIC BLOOD PRESSURE: 78 MMHG | HEART RATE: 74 BPM | TEMPERATURE: 98 F | SYSTOLIC BLOOD PRESSURE: 155 MMHG | WEIGHT: 293 LBS | HEIGHT: 64 IN | RESPIRATION RATE: 16 BRPM

## 2019-11-27 DIAGNOSIS — Z98.890 OTHER SPECIFIED POSTPROCEDURAL STATES: Chronic | ICD-10-CM

## 2019-11-27 DIAGNOSIS — N95.0 POSTMENOPAUSAL BLEEDING: Chronic | ICD-10-CM

## 2019-11-27 DIAGNOSIS — N95.0 POSTMENOPAUSAL BLEEDING: ICD-10-CM

## 2019-11-27 LAB
GLUCOSE BLDC GLUCOMTR-MCNC: 109 MG/DL — HIGH (ref 70–99)
GLUCOSE BLDC GLUCOMTR-MCNC: 139 MG/DL — HIGH (ref 70–99)
RH IG SCN BLD-IMP: POSITIVE — SIGNIFICANT CHANGE UP

## 2019-11-27 PROCEDURE — 88307 TISSUE EXAM BY PATHOLOGIST: CPT | Mod: 26

## 2019-11-27 PROCEDURE — S2900 ROBOTIC SURGICAL SYSTEM: CPT | Mod: NC

## 2019-11-27 PROCEDURE — S2900 ROBOTIC SURGICAL SYSTEM: CPT | Mod: NC,80

## 2019-11-27 PROCEDURE — 58571 TLH W/T/O 250 G OR LESS: CPT | Mod: 80

## 2019-11-27 PROCEDURE — 52000 CYSTOURETHROSCOPY: CPT | Mod: 59

## 2019-11-27 PROCEDURE — 58571 TLH W/T/O 250 G OR LESS: CPT

## 2019-11-27 RX ORDER — DEXTROSE 50 % IN WATER 50 %
12.5 SYRINGE (ML) INTRAVENOUS ONCE
Refills: 0 | Status: DISCONTINUED | OUTPATIENT
Start: 2019-11-27 | End: 2019-11-28

## 2019-11-27 RX ORDER — LOSARTAN POTASSIUM 100 MG/1
50 TABLET, FILM COATED ORAL DAILY
Refills: 0 | Status: DISCONTINUED | OUTPATIENT
Start: 2019-11-27 | End: 2019-11-28

## 2019-11-27 RX ORDER — ACETAMINOPHEN 500 MG
3 TABLET ORAL
Qty: 0 | Refills: 0 | DISCHARGE
Start: 2019-11-27

## 2019-11-27 RX ORDER — ENOXAPARIN SODIUM 100 MG/ML
40 INJECTION SUBCUTANEOUS EVERY 24 HOURS
Refills: 0 | Status: DISCONTINUED | OUTPATIENT
Start: 2019-11-28 | End: 2019-11-28

## 2019-11-27 RX ORDER — HYDROMORPHONE HYDROCHLORIDE 2 MG/ML
0.5 INJECTION INTRAMUSCULAR; INTRAVENOUS; SUBCUTANEOUS
Refills: 0 | Status: DISCONTINUED | OUTPATIENT
Start: 2019-11-27 | End: 2019-11-28

## 2019-11-27 RX ORDER — OXYCODONE HYDROCHLORIDE 5 MG/1
5 TABLET ORAL EVERY 4 HOURS
Refills: 0 | Status: DISCONTINUED | OUTPATIENT
Start: 2019-11-27 | End: 2019-11-28

## 2019-11-27 RX ORDER — HYDROCHLOROTHIAZIDE 25 MG
25 TABLET ORAL DAILY
Refills: 0 | Status: DISCONTINUED | OUTPATIENT
Start: 2019-11-27 | End: 2019-11-27

## 2019-11-27 RX ORDER — KETOROLAC TROMETHAMINE 30 MG/ML
30 SYRINGE (ML) INJECTION ONCE
Refills: 0 | Status: DISCONTINUED | OUTPATIENT
Start: 2019-11-28 | End: 2019-11-28

## 2019-11-27 RX ORDER — HEPARIN SODIUM 5000 [USP'U]/ML
5000 INJECTION INTRAVENOUS; SUBCUTANEOUS ONCE
Refills: 0 | Status: COMPLETED | OUTPATIENT
Start: 2019-11-27 | End: 2019-11-27

## 2019-11-27 RX ORDER — OXYCODONE HYDROCHLORIDE 5 MG/1
2.5 TABLET ORAL EVERY 6 HOURS
Refills: 0 | Status: DISCONTINUED | OUTPATIENT
Start: 2019-11-27 | End: 2019-11-28

## 2019-11-27 RX ORDER — GLUCAGON INJECTION, SOLUTION 0.5 MG/.1ML
1 INJECTION, SOLUTION SUBCUTANEOUS ONCE
Refills: 0 | Status: DISCONTINUED | OUTPATIENT
Start: 2019-11-27 | End: 2019-11-28

## 2019-11-27 RX ORDER — OXYCODONE HYDROCHLORIDE 5 MG/1
1 TABLET ORAL
Qty: 8 | Refills: 0
Start: 2019-11-27

## 2019-11-27 RX ORDER — ONDANSETRON 8 MG/1
4 TABLET, FILM COATED ORAL ONCE
Refills: 0 | Status: DISCONTINUED | OUTPATIENT
Start: 2019-11-27 | End: 2019-11-28

## 2019-11-27 RX ORDER — DEXTROSE 50 % IN WATER 50 %
25 SYRINGE (ML) INTRAVENOUS ONCE
Refills: 0 | Status: DISCONTINUED | OUTPATIENT
Start: 2019-11-27 | End: 2019-11-28

## 2019-11-27 RX ORDER — SODIUM CHLORIDE 9 MG/ML
1000 INJECTION, SOLUTION INTRAVENOUS
Refills: 0 | Status: DISCONTINUED | OUTPATIENT
Start: 2019-11-27 | End: 2019-11-27

## 2019-11-27 RX ORDER — ACETAMINOPHEN 500 MG
975 TABLET ORAL EVERY 6 HOURS
Refills: 0 | Status: DISCONTINUED | OUTPATIENT
Start: 2019-11-28 | End: 2019-11-28

## 2019-11-27 RX ORDER — SODIUM CHLORIDE 9 MG/ML
1000 INJECTION, SOLUTION INTRAVENOUS
Refills: 0 | Status: DISCONTINUED | OUTPATIENT
Start: 2019-11-27 | End: 2019-11-28

## 2019-11-27 RX ORDER — INSULIN LISPRO 100/ML
VIAL (ML) SUBCUTANEOUS
Refills: 0 | Status: DISCONTINUED | OUTPATIENT
Start: 2019-11-27 | End: 2019-11-28

## 2019-11-27 RX ORDER — LOSARTAN POTASSIUM 100 MG/1
50 TABLET, FILM COATED ORAL DAILY
Refills: 0 | Status: DISCONTINUED | OUTPATIENT
Start: 2019-11-27 | End: 2019-11-27

## 2019-11-27 RX ORDER — INSULIN LISPRO 100/ML
VIAL (ML) SUBCUTANEOUS AT BEDTIME
Refills: 0 | Status: DISCONTINUED | OUTPATIENT
Start: 2019-11-27 | End: 2019-11-28

## 2019-11-27 RX ORDER — HYDROCHLOROTHIAZIDE 25 MG
25 TABLET ORAL DAILY
Refills: 0 | Status: DISCONTINUED | OUTPATIENT
Start: 2019-11-27 | End: 2019-11-28

## 2019-11-27 RX ORDER — ACETAMINOPHEN 500 MG
1000 TABLET ORAL ONCE
Refills: 0 | Status: COMPLETED | OUTPATIENT
Start: 2019-11-27 | End: 2019-11-27

## 2019-11-27 RX ORDER — DEXTROSE 50 % IN WATER 50 %
15 SYRINGE (ML) INTRAVENOUS ONCE
Refills: 0 | Status: DISCONTINUED | OUTPATIENT
Start: 2019-11-27 | End: 2019-11-28

## 2019-11-27 RX ADMIN — HYDROMORPHONE HYDROCHLORIDE 0.5 MILLIGRAM(S): 2 INJECTION INTRAMUSCULAR; INTRAVENOUS; SUBCUTANEOUS at 20:50

## 2019-11-27 RX ADMIN — HYDROMORPHONE HYDROCHLORIDE 0.5 MILLIGRAM(S): 2 INJECTION INTRAMUSCULAR; INTRAVENOUS; SUBCUTANEOUS at 21:05

## 2019-11-27 RX ADMIN — HYDROMORPHONE HYDROCHLORIDE 0.5 MILLIGRAM(S): 2 INJECTION INTRAMUSCULAR; INTRAVENOUS; SUBCUTANEOUS at 21:45

## 2019-11-27 RX ADMIN — HYDROMORPHONE HYDROCHLORIDE 0.5 MILLIGRAM(S): 2 INJECTION INTRAMUSCULAR; INTRAVENOUS; SUBCUTANEOUS at 21:25

## 2019-11-27 RX ADMIN — SODIUM CHLORIDE 180 MILLILITER(S): 9 INJECTION, SOLUTION INTRAVENOUS at 20:51

## 2019-11-27 RX ADMIN — Medication 400 MILLIGRAM(S): at 23:12

## 2019-11-27 RX ADMIN — SODIUM CHLORIDE 30 MILLILITER(S): 9 INJECTION, SOLUTION INTRAVENOUS at 13:35

## 2019-11-27 RX ADMIN — HEPARIN SODIUM 5000 UNIT(S): 5000 INJECTION INTRAVENOUS; SUBCUTANEOUS at 23:12

## 2019-11-27 NOTE — DISCHARGE NOTE PROVIDER - PROVIDER TOKENS
PROVIDER:[TOKEN:[[id number]],SCHEDULEDAPPT:[12/12/2019],SCHEDULEDAPPTTIME:[01:30 PM],ESTABLISHEDPATIENT:[T]]

## 2019-11-27 NOTE — DISCHARGE NOTE PROVIDER - NSDCMRMEDTOKEN_GEN_ALL_CORE_FT
metFORMIN 500 mg oral tablet: 1 tab(s) orally daily  valsartan-hydrochlorothiazide 160mg-25mg oral tablet: 1 tab(s) orally once a day acetaminophen 325 mg oral tablet: 3 tab(s) orally every 6 hours, take regularly and do not skip doses for next 3 days  metFORMIN 500 mg oral tablet: 1 tab(s) orally daily  naproxen sodium 220 mg oral tablet: 2 tab(s) orally every 12 hours, take regularly and do not skip doses for next 3 days  oxyCODONE 5 mg oral tablet: 1 tab(s) orally every 4 to 6 hours, As Needed -Severe Pain (7 - 10) MDD:4 tabs  valsartan-hydrochlorothiazide 160mg-25mg oral tablet: 1 tab(s) orally once a day

## 2019-11-27 NOTE — DISCHARGE NOTE PROVIDER - HOSPITAL COURSE
S/p RA TL-BSO, cystoscopy on 11/27. Procedure finished late, pt was kept over night for monitoring. No complication postoperatively, patient went home on POD#1 with good pain control, ambulating, tolerating diet, and voiding spontaneously S/p RA TL-BSO, cystoscopy on 11/27. Procedure finished late, pt was kept over night for monitoring. No complication postoperatively, patient went home on POD#1 with good pain control, ambulating, tolerating diet, and voiding spontaneously.

## 2019-11-27 NOTE — DISCHARGE NOTE PROVIDER - NSDCFUADDINST_GEN_ALL_CORE_FT
Regular diet as tolerated, regular activity as tolerated, no heavy lifting for first two weeks.  Nothing per vagina: no intercourse, tampons or douching for 8 weeks.  Call your provider if you experience fevers, chills, worsening abdominal pain, inability to urinate or worsening vaginal bleeding.  Follow up with Dr. Cheney at your scheduled appointment 12/12/19 at 1:30 pm

## 2019-11-27 NOTE — DISCHARGE NOTE PROVIDER - CARE PROVIDER_API CALL
Deepak Cheney)  Obstetrics and Gynecology  [address] [place] [address], [address]  [place] [address]  Phone: [phone]  Fax: [phone]  Established Patient  Scheduled Appointment: 12/12/2019 01:30 PM

## 2019-11-27 NOTE — BRIEF OPERATIVE NOTE - NSICDXBRIEFPROCEDURE_GEN_ALL_CORE_FT
PROCEDURES:  Robot-assisted bilateral salpingo-oophorectomy (BSO) 27-Nov-2019 19:19:17  Fidel Reis  Robot-assisted laparoscopic total hysterectomy with cystoscopy 27-Nov-2019 19:19:08  Fidel Reis

## 2019-11-27 NOTE — BRIEF OPERATIVE NOTE - OPERATION/FINDINGS
10w sized uterus with multiple scattered small fibroids, normal appendix, normal tubes and ovaries b/l, small amount of adhesions of left side of rectum and colon to pelvic side wall, normal ureteral jets and bladder wall on cystoscopy post-hsyterectomy

## 2019-11-27 NOTE — CHART NOTE - NSCHARTNOTEFT_GEN_A_CORE
Patient seen and examined at bedside, recently post-op. No acute complaints at this time. Denies CP, SOB, N/V, fevers, and chills.    Vital Signs Last 24 Hours  T(C): 36.5 (11-27-19 @ 19:40), Max: 36.9 (11-27-19 @ 13:03)  HR: 58 (11-27-19 @ 22:00) (57 - 74)  BP: 145/73 (11-27-19 @ 21:45) (118/58 - 155/78)  RR: 16 (11-27-19 @ 22:00) (9 - 17)  SpO2: 100% (11-27-19 @ 22:00) (95% - 100%)    I&O's Summary    27 Nov 2019 07:01  -  27 Nov 2019 22:03  --------------------------------------------------------  IN: 540 mL / OUT: 0 mL / NET: 540 mL        Physical Exam:  General: NAD  CV: NR, RR, S1, S2, no M/R/G  Lungs: CTA-B  Abdomen: Soft, appropriately tender, non-distended, +BS  Incision: 4 port sites CDI  Ext: No pain or swelling    Labs:      MEDICATIONS  (STANDING):  acetaminophen  IVPB .. 1000 milliGRAM(s) IV Intermittent once  dextrose 5%. 1000 milliLiter(s) (50 mL/Hr) IV Continuous <Continuous>  dextrose 50% Injectable 12.5 Gram(s) IV Push once  dextrose 50% Injectable 25 Gram(s) IV Push once  dextrose 50% Injectable 25 Gram(s) IV Push once  heparin  Injectable 5000 Unit(s) SubCutaneous once  hydrochlorothiazide 25 milliGRAM(s) Oral daily  insulin lispro (HumaLOG) corrective regimen sliding scale   SubCutaneous three times a day before meals  insulin lispro (HumaLOG) corrective regimen sliding scale   SubCutaneous at bedtime  lactated ringers. 1000 milliLiter(s) (180 mL/Hr) IV Continuous <Continuous>  losartan 50 milliGRAM(s) Oral daily    MEDICATIONS  (PRN):  dextrose 40% Gel 15 Gram(s) Oral once PRN Blood Glucose LESS THAN 70 milliGRAM(s)/deciliter  glucagon  Injectable 1 milliGRAM(s) IntraMuscular once PRN Glucose LESS THAN 70 milligrams/deciliter  HYDROmorphone  Injectable 0.5 milliGRAM(s) IV Push every 10 minutes PRN Moderate Pain (4 - 6)  ondansetron Injectable 4 milliGRAM(s) IV Push once PRN Nausea and/or Vomiting  oxyCODONE    IR 5 milliGRAM(s) Oral every 4 hours PRN Severe Pain (7 - 10)  oxyCODONE    IR 2.5 milliGRAM(s) Oral every 6 hours PRN Moderate Pain (4 - 6)    A/P: 60yo POD0 s/p RA-TLH, BSO, cysto. Hemodynamically stable.    Neuro: toradol and tylenol IV x1 each overnight, then PO analgesia  CV: VSS, CBC in AM. Losartan, HCTZ for PMH HTN.  Pulm: Saturating well on room air, encourage incentive spirometry, continuous pulse ox for BMI  GI: ADA regular diet, ISS  : DTV@230a  Heme: HSQ x1 overnight and lovenox in AM, SCDs for DVT ppx  Dispo: Continue routine post-op care    CONSUELO Gonzalez PGY2

## 2019-11-27 NOTE — DISCHARGE NOTE PROVIDER - NSDCFUSCHEDAPPT_GEN_ALL_CORE_FT
KULDEEP CORRALES ; 12/12/2019 ; [facility]  [address] [facility] KULDEEP CORRALES ; 12/12/2019 ; [facility]  [address] [facility] KULDEEP CORRALES ; 12/12/2019 ; [facility]  [facility]

## 2019-11-28 ENCOUNTER — TRANSCRIPTION ENCOUNTER (OUTPATIENT)
Age: 59
End: 2019-11-28

## 2019-11-28 VITALS — SYSTOLIC BLOOD PRESSURE: 118 MMHG | HEART RATE: 72 BPM | TEMPERATURE: 98 F | DIASTOLIC BLOOD PRESSURE: 45 MMHG

## 2019-11-28 DIAGNOSIS — Z98.890 OTHER SPECIFIED POSTPROCEDURAL STATES: ICD-10-CM

## 2019-11-28 LAB
BASOPHILS # BLD AUTO: 0.02 K/UL — SIGNIFICANT CHANGE UP (ref 0–0.2)
BASOPHILS NFR BLD AUTO: 0.1 % — SIGNIFICANT CHANGE UP (ref 0–2)
EOSINOPHIL # BLD AUTO: 0 K/UL — SIGNIFICANT CHANGE UP (ref 0–0.5)
EOSINOPHIL NFR BLD AUTO: 0 % — SIGNIFICANT CHANGE UP (ref 0–6)
GLUCOSE BLDC GLUCOMTR-MCNC: 157 MG/DL — HIGH (ref 70–99)
HCT VFR BLD CALC: 35.9 % — SIGNIFICANT CHANGE UP (ref 34.5–45)
HGB BLD-MCNC: 10.7 G/DL — LOW (ref 11.5–15.5)
IMM GRANULOCYTES NFR BLD AUTO: 0.4 % — SIGNIFICANT CHANGE UP (ref 0–1.5)
LYMPHOCYTES # BLD AUTO: 0.94 K/UL — LOW (ref 1–3.3)
LYMPHOCYTES # BLD AUTO: 6.5 % — LOW (ref 13–44)
MCHC RBC-ENTMCNC: 23.9 PG — LOW (ref 27–34)
MCHC RBC-ENTMCNC: 29.8 % — LOW (ref 32–36)
MCV RBC AUTO: 80.1 FL — SIGNIFICANT CHANGE UP (ref 80–100)
MONOCYTES # BLD AUTO: 1 K/UL — HIGH (ref 0–0.9)
MONOCYTES NFR BLD AUTO: 6.9 % — SIGNIFICANT CHANGE UP (ref 2–14)
NEUTROPHILS # BLD AUTO: 12.55 K/UL — HIGH (ref 1.8–7.4)
NEUTROPHILS NFR BLD AUTO: 86.1 % — HIGH (ref 43–77)
NRBC # FLD: 0 K/UL — SIGNIFICANT CHANGE UP (ref 0–0)
PLATELET # BLD AUTO: 239 K/UL — SIGNIFICANT CHANGE UP (ref 150–400)
PMV BLD: 12.4 FL — SIGNIFICANT CHANGE UP (ref 7–13)
RBC # BLD: 4.48 M/UL — SIGNIFICANT CHANGE UP (ref 3.8–5.2)
RBC # FLD: 15.7 % — HIGH (ref 10.3–14.5)
WBC # BLD: 14.57 K/UL — HIGH (ref 3.8–10.5)
WBC # FLD AUTO: 14.57 K/UL — HIGH (ref 3.8–10.5)

## 2019-11-28 RX ORDER — INFLUENZA VIRUS VACCINE 15; 15; 15; 15 UG/.5ML; UG/.5ML; UG/.5ML; UG/.5ML
0.5 SUSPENSION INTRAMUSCULAR ONCE
Refills: 0 | Status: COMPLETED | OUTPATIENT
Start: 2019-11-28 | End: 2019-11-28

## 2019-11-28 RX ADMIN — Medication 30 MILLIGRAM(S): at 02:11

## 2019-11-28 RX ADMIN — OXYCODONE HYDROCHLORIDE 5 MILLIGRAM(S): 5 TABLET ORAL at 06:03

## 2019-11-28 RX ADMIN — Medication 975 MILLIGRAM(S): at 05:30

## 2019-11-28 RX ADMIN — OXYCODONE HYDROCHLORIDE 5 MILLIGRAM(S): 5 TABLET ORAL at 05:31

## 2019-11-28 RX ADMIN — OXYCODONE HYDROCHLORIDE 5 MILLIGRAM(S): 5 TABLET ORAL at 09:49

## 2019-11-28 RX ADMIN — Medication 2: at 07:46

## 2019-11-28 RX ADMIN — Medication 25 MILLIGRAM(S): at 05:31

## 2019-11-28 RX ADMIN — LOSARTAN POTASSIUM 50 MILLIGRAM(S): 100 TABLET, FILM COATED ORAL at 05:31

## 2019-11-28 RX ADMIN — Medication 500 MILLIGRAM(S): at 07:47

## 2019-11-28 NOTE — DISCHARGE NOTE NURSING/CASE MANAGEMENT/SOCIAL WORK - NSDCPNDISPN_GEN_ALL_CORE
Education provided on the pain management plan of care/Safe use, storage and disposal of opioids when prescribed/Opioids not applicable/not prescribed/Side effects of pain management treatment/Activities of daily living, including home environment that might     exacerbate pain or reduce effectiveness of the pain management plan of care as well as strategies to address these issues

## 2019-11-28 NOTE — PROGRESS NOTE ADULT - PROBLEM SELECTOR PLAN 1
CV: hemodynamically stable, f/u AM CBC. Continue home losartan, HCTZ    Pulm: no acute issue, incentive spirometry use encouraged  GI: continue regular diabetic diet, SSI   : voiding spontaneously   Heme: Lovenox, venodynes for DVT prophylaxis   Neuro: Tylenol, naproxen, oxycodone PRN for pain   Dispo: discharge planning CV: hemodynamically stable, f/u AM CBC. Continue home losartan, HCTZ    Pulm: no acute issue, incentive spirometry use encouraged  GI: continue regular diabetic diet, SSI   : voiding spontaneously   Heme: Lovenox, venodynes for DVT prophylaxis   Neuro: Tylenol, naproxen, oxycodone PRN for pain   Dispo: discharge today

## 2019-11-28 NOTE — DISCHARGE NOTE NURSING/CASE MANAGEMENT/SOCIAL WORK - PATIENT PORTAL LINK FT
You can access the FollowMyHealth Patient Portal offered by [facility] by registering at the following website: [email or web address]. By joining [employer]’s FollowMyHealth portal, you will also be able to view your health information using other applications (apps) compatible with our system.

## 2019-11-28 NOTE — PROGRESS NOTE ADULT - SUBJECTIVE AND OBJECTIVE BOX
HD#2     POD#1     Patient seen and examined at bedside, no acute overnight events. No acute complaints, pain well controlled. Patient is ambulating, passing flatus, voiding spontaneously, and tolerating regular diet. Denies CP, SOB, N/V, fevers, and chills.    Vital Signs Last 24 Hours  T(C): 36.7 (11-28-19 @ 05:39), Max: 36.9 (11-27-19 @ 13:03)  HR: 68 (11-28-19 @ 05:39) (57 - 79)  BP: 130/45 (11-28-19 @ 05:39) (118/58 - 155/91)  RR: 16 (11-28-19 @ 05:39) (9 - 18)  SpO2: 100% (11-28-19 @ 05:39) (95% - 100%)    I&O's Summary    27 Nov 2019 07:01  -  28 Nov 2019 07:00  --------------------------------------------------------  IN: 540 mL / OUT: 670 mL / NET: -130 mL        Physical Exam:  General: NAD  CV: RRR, S1, S2, no M/R/G  Lungs: CTAB  Abdomen: Soft, non-tender, non-distended, +BS  Incision: Lsc incisions x5 c/d/i with steri strips   Ext: No pain or swelling    Labs:             10.7<L>  14.57<H> )-----------( 239      ( 11-28 @ 07:00 )             35.9         MEDICATIONS  (STANDING):  acetaminophen   Tablet .. 975 milliGRAM(s) Oral every 6 hours  dextrose 5%. 1000 milliLiter(s) (50 mL/Hr) IV Continuous <Continuous>  dextrose 50% Injectable 12.5 Gram(s) IV Push once  dextrose 50% Injectable 25 Gram(s) IV Push once  dextrose 50% Injectable 25 Gram(s) IV Push once  enoxaparin Injectable 40 milliGRAM(s) SubCutaneous every 24 hours  hydrochlorothiazide 25 milliGRAM(s) Oral daily  influenza   Vaccine 0.5 milliLiter(s) IntraMuscular once  insulin lispro (HumaLOG) corrective regimen sliding scale   SubCutaneous at bedtime  insulin lispro (HumaLOG) corrective regimen sliding scale   SubCutaneous three times a day before meals  lactated ringers. 1000 milliLiter(s) (180 mL/Hr) IV Continuous <Continuous>  losartan 50 milliGRAM(s) Oral daily  naproxen 500 milliGRAM(s) Oral every 12 hours    MEDICATIONS  (PRN):  dextrose 40% Gel 15 Gram(s) Oral once PRN Blood Glucose LESS THAN 70 milliGRAM(s)/deciliter  glucagon  Injectable 1 milliGRAM(s) IntraMuscular once PRN Glucose LESS THAN 70 milligrams/deciliter  HYDROmorphone  Injectable 0.5 milliGRAM(s) IV Push every 10 minutes PRN Moderate Pain (4 - 6)  ondansetron Injectable 4 milliGRAM(s) IV Push once PRN Nausea and/or Vomiting  oxyCODONE    IR 2.5 milliGRAM(s) Oral every 6 hours PRN Moderate Pain (4 - 6)  oxyCODONE    IR 5 milliGRAM(s) Oral every 4 hours PRN Severe Pain (7 - 10)

## 2019-11-28 NOTE — PROGRESS NOTE ADULT - ATTENDING COMMENTS
I have personally seen and examined the patient this morning. I have edited the above note and agree with the assessment and plan.     Usha Mata MD  Department of Obstetrics and Gynecology

## 2019-11-28 NOTE — DISCHARGE NOTE NURSING/CASE MANAGEMENT/SOCIAL WORK - NSDCPNINST_GEN_ALL_CORE
Call MD if you develop a fever greater than 100.4, nausea or vomiting, difficulty urinating or blood in the urine, or heavy vaginal bleeding. Call MD if you have pain unrelieved by pain medication. Steri strips on incisions will come off by themselves within 1-2 weeks. Call MD if you develop a fever greater than 100.4, nausea or vomiting, difficulty urinating or blood in the urine, or heavy vaginal bleeding. Call MD if you have pain unrelieved by pain medication. Steri strips on incisions will come off by themselves within 1-2 weeks. You may take over the counter stool softeners to prevent constipation from pain medications.

## 2019-12-04 LAB — SURGICAL PATHOLOGY STUDY: SIGNIFICANT CHANGE UP

## 2019-12-12 ENCOUNTER — APPOINTMENT (OUTPATIENT)
Dept: OBGYN | Facility: CLINIC | Age: 59
End: 2019-12-12
Payer: COMMERCIAL

## 2019-12-12 PROCEDURE — 99024 POSTOP FOLLOW-UP VISIT: CPT

## 2019-12-26 ENCOUNTER — APPOINTMENT (OUTPATIENT)
Dept: OBGYN | Facility: CLINIC | Age: 59
End: 2019-12-26
Payer: COMMERCIAL

## 2019-12-26 PROCEDURE — 99024 POSTOP FOLLOW-UP VISIT: CPT

## 2020-01-02 ENCOUNTER — APPOINTMENT (OUTPATIENT)
Dept: OBGYN | Facility: CLINIC | Age: 60
End: 2020-01-02
Payer: COMMERCIAL

## 2020-01-02 PROCEDURE — 99024 POSTOP FOLLOW-UP VISIT: CPT

## 2020-02-25 NOTE — H&P PST ADULT - ENDOCRINE COMMENTS
Pt tolerated orange jello per request and orange juice    prediabetes -- doesn't do finger sticks; denies thyroid disease

## 2020-11-06 NOTE — ASU PREOP CHECKLIST - STERILIZATION AFFIRMATION
n/a Consent 2/Introductory Paragraph: Mohs surgery was explained to the patient and consent was obtained. The risks, benefits and alternatives to therapy were discussed in detail. Specifically, the risks of infection, scarring, bleeding, prolonged wound healing, incomplete removal, allergy to anesthesia, nerve injury and recurrence were addressed. Prior to the procedure, the treatment site was clearly identified and confirmed by the patient. All components of Universal Protocol/PAUSE Rule completed.

## 2022-03-16 NOTE — H&P PST ADULT - LYMPH NODES
Left message for patient to call office back.
Patient aware and voiced understanding, no concerns voiced at this time.
Pt's home INR is 3.2. Hold 1 dose of Coumadin and recheck INR next week.
detailed exam

## 2022-04-27 ENCOUNTER — NON-APPOINTMENT (OUTPATIENT)
Age: 62
End: 2022-04-27

## 2022-04-27 ENCOUNTER — APPOINTMENT (OUTPATIENT)
Dept: PULMONOLOGY | Facility: CLINIC | Age: 62
End: 2022-04-27
Payer: COMMERCIAL

## 2022-04-27 VITALS
OXYGEN SATURATION: 95 % | TEMPERATURE: 98.2 F | SYSTOLIC BLOOD PRESSURE: 183 MMHG | HEART RATE: 84 BPM | DIASTOLIC BLOOD PRESSURE: 85 MMHG

## 2022-04-27 DIAGNOSIS — R05.9 COUGH, UNSPECIFIED: ICD-10-CM

## 2022-04-27 DIAGNOSIS — U07.1 COVID-19: ICD-10-CM

## 2022-04-27 PROCEDURE — 99204 OFFICE O/P NEW MOD 45 MIN: CPT | Mod: 25

## 2022-04-27 PROCEDURE — 71046 X-RAY EXAM CHEST 2 VIEWS: CPT

## 2022-04-27 PROCEDURE — ZZZZZ: CPT

## 2022-04-27 PROCEDURE — 94010 BREATHING CAPACITY TEST: CPT

## 2022-04-27 RX ORDER — METFORMIN HYDROCHLORIDE 625 MG/1
TABLET ORAL
Refills: 0 | Status: DISCONTINUED | COMMUNITY
End: 2022-04-27

## 2022-04-27 RX ORDER — VALSARTAN 40 MG/1
TABLET ORAL
Refills: 0 | Status: ACTIVE | COMMUNITY

## 2022-04-27 NOTE — ASSESSMENT
[FreeTextEntry1] : check HST\par f/u after HST\par may repeat lubna and feno if she is still coughing\par obtain dimer results from Dr Bagley office\par \par

## 2022-04-27 NOTE — PHYSICAL EXAM
[No Acute Distress] : no acute distress [Well Nourished] : well nourished [Well Developed] : well developed [IV] : Mallampati Class: IV [Normal Rate/Rhythm] : normal rate/rhythm [Normal S1, S2] : normal s1, s2 [No Resp Distress] : no resp distress [No Acc Muscle Use] : no acc muscle use [Clear to Auscultation Bilaterally] : clear to auscultation bilaterally [Oriented x3] : oriented x3

## 2022-04-27 NOTE — PROCEDURE
[FreeTextEntry1] : [facility]ology US doppler negative for dvt\par report scanned into chart\par \par PA and lateral chest xray performed using standard projections. Bones and soft tissues structures are unremarkable.Cardiac silhouette shows perihilar fullnes b/l. Lung fields are clear. No infiltrate or masses noted. Mediastinal contours are normal.\par IMPRESSION:  Perihilar fullness b/l\par \par \par pfts show mild restriction likely due to weight\par \par

## 2022-04-27 NOTE — HISTORY OF PRESENT ILLNESS
[Never] : never [TextBox_4] : KULDEEP CORRLAES is a 61 year old female who presents from DR Bagley office\par \par covid 19 this jan 2022- \par cough, 2 weeks\par no meds- no aspirin, no inhalers, \par otc cough meds\par \par saw DR Bagley for routine check  up this weke\par here for pulm eval s/p covid\par NOW- occasional cough, some dyspnea\par \par no asthma history. no  wheezing\par got US dopplers done at [facility]\par \par \par +snoring. wakes up refreshed. no headaches, no dry mouth\par never been testedf or SARAH\par \par never smoker\par

## 2022-05-26 ENCOUNTER — APPOINTMENT (OUTPATIENT)
Dept: PULMONOLOGY | Facility: CLINIC | Age: 62
End: 2022-05-26
Payer: COMMERCIAL

## 2022-05-26 PROCEDURE — 95800 SLP STDY UNATTENDED: CPT

## 2022-05-30 PROCEDURE — 95800 SLP STDY UNATTENDED: CPT

## 2022-06-10 NOTE — H&P PST ADULT - ALLERGIC/IMMUNOLOGIC COMMENTS
----- Message from Cassie Simental MD sent at 6/7/2022  8:57 AM CDT -----  Please give pt. Results - A1C is terrible. She is better now - keep with the diet  
see neck section

## 2022-06-22 ENCOUNTER — APPOINTMENT (OUTPATIENT)
Dept: PULMONOLOGY | Facility: CLINIC | Age: 62
End: 2022-06-22
Payer: COMMERCIAL

## 2022-06-22 VITALS
BODY MASS INDEX: 50.02 KG/M2 | TEMPERATURE: 98 F | SYSTOLIC BLOOD PRESSURE: 123 MMHG | WEIGHT: 293 LBS | DIASTOLIC BLOOD PRESSURE: 65 MMHG | HEIGHT: 64 IN | OXYGEN SATURATION: 96 % | HEART RATE: 88 BPM

## 2022-06-22 PROCEDURE — 99213 OFFICE O/P EST LOW 20 MIN: CPT

## 2022-06-22 NOTE — ASSESSMENT
[FreeTextEntry1] : discussed tx options\par will opt for oral appliance therapy and wants to work on weight loss\par will refer to dental\par would like to repeat hst once she has oral appliance to ensure it is working

## 2023-03-01 NOTE — ASU DISCHARGE PLAN (ADULT/PEDIATRIC) - PROVIDER TOKENS
PROVIDER:[TOKEN:[[id number]]] [No Acute Distress] : no acute distress [Well Nourished] : well nourished [Well Developed] : well developed [Well-Appearing] : well-appearing [Normal Sclera/Conjunctiva] : normal sclera/conjunctiva [PERRL] : pupils equal round and reactive to light [EOMI] : extraocular movements intact [Normal Outer Ear/Nose] : the outer ears and nose were normal in appearance [Normal Oropharynx] : the oropharynx was normal [No JVD] : no jugular venous distention [No Lymphadenopathy] : no lymphadenopathy [Supple] : supple [Thyroid Normal, No Nodules] : the thyroid was normal and there were no nodules present [No Respiratory Distress] : no respiratory distress  [No Accessory Muscle Use] : no accessory muscle use [Clear to Auscultation] : lungs were clear to auscultation bilaterally [Normal Rate] : normal rate  [Regular Rhythm] : with a regular rhythm [Normal S1, S2] : normal S1 and S2 [No Murmur] : no murmur heard [No Carotid Bruits] : no carotid bruits [No Abdominal Bruit] : a ~M bruit was not heard ~T in the abdomen [No Varicosities] : no varicosities [Pedal Pulses Present] : the pedal pulses are present [No Edema] : there was no peripheral edema [No Palpable Aorta] : no palpable aorta [No Extremity Clubbing/Cyanosis] : no extremity clubbing/cyanosis [Soft] : abdomen soft [Non Tender] : non-tender [Non-distended] : non-distended [No Masses] : no abdominal mass palpated [No HSM] : no HSM [Normal Bowel Sounds] : normal bowel sounds [Normal Posterior Cervical Nodes] : no posterior cervical lymphadenopathy [Normal Anterior Cervical Nodes] : no anterior cervical lymphadenopathy [No CVA Tenderness] : no CVA  tenderness [No Spinal Tenderness] : no spinal tenderness [No Joint Swelling] : no joint swelling [Grossly Normal Strength/Tone] : grossly normal strength/tone [No Rash] : no rash [Coordination Grossly Intact] : coordination grossly intact [No Focal Deficits] : no focal deficits [Normal Gait] : normal gait [Deep Tendon Reflexes (DTR)] : deep tendon reflexes were 2+ and symmetric [Normal Affect] : the affect was normal [Normal Insight/Judgement] : insight and judgment were intact

## 2023-03-30 ENCOUNTER — APPOINTMENT (OUTPATIENT)
Dept: BARIATRICS/WEIGHT MGMT | Facility: CLINIC | Age: 63
End: 2023-03-30
Payer: COMMERCIAL

## 2023-03-30 PROCEDURE — 99214 OFFICE O/P EST MOD 30 MIN: CPT | Mod: 95

## 2023-03-31 NOTE — HISTORY OF PRESENT ILLNESS
[Home] : at home, [unfilled] , at the time of the visit. [Other Location: e.g. Home (Enter Location, City,State)___] : at [unfilled] [Verbal consent obtained from patient] : the patient, [unfilled] [FreeTextEntry1] : Patient presents for weight loss and overweight/obese comorbidity management\par \par Chelsea has had steady weight gain over years\par is now retired, with lots of free time\par lives in [place] and goes to [place] during winter\par lives with , has 6 grown children, 3 are stepchildren\par reports open mindedness about foods\par and wants to improve diet\par also wants to start a glp-1\par has a diagnosis of t2dm, prior a1c of 6.8, stable now\par has heard of wegovy, was prescribed ozempic\par and has started taking that at 0.25, now 2 doses\par having a helpful effect on appetite\par has vanna, and is intermittent adherent with cpap\par not active at this time d/t knee pain\par but enjoys the gym, and is able to do some workouts\par does treadmill eliptical weights in past\par \par Due to comorbidities this patient requires medical treatment for overweight / obesity\par

## 2023-03-31 NOTE — ASSESSMENT
[FreeTextEntry1] : Bariatric surgery history: none\par Overweight / obesity comorbidities: t2dm vanna\par Current anti-obesity medications: none\par Obesity medication side effects: n/a\par \par -Nutrition and lifestyle concepts were reviewed in detail. We discussed the central role of diet in weight loss. We discussed what research has demonstrated is a sustainable strategy for substantial weight loss and health improvement. I am encouraging the patient to move at their own pace toward an unprocessed diet with >= 50% of meals based around starches, vegetables, fruits. The plate method was reviewed: meal proportions for weight loss are half starches, and half fruits and/or vegetables. We discussed the importance of having most food early in the day; most calories before 4 pm, and no food after 8 pm. I advised starting, when ready, with small sustainable goals and building up over time. Initial guidance and a number of take-home resources to help initiate these changes were provided.\par -increase ozempic to 0.5 mg after 4 weeks at 0.25 mg\par -fasting therapy counseling and resources provided\par \par

## 2023-05-04 ENCOUNTER — APPOINTMENT (OUTPATIENT)
Dept: BARIATRICS/WEIGHT MGMT | Facility: CLINIC | Age: 63
End: 2023-05-04
Payer: COMMERCIAL

## 2023-05-04 DIAGNOSIS — R11.0 NAUSEA: ICD-10-CM

## 2023-05-04 PROCEDURE — 99214 OFFICE O/P EST MOD 30 MIN: CPT | Mod: 95

## 2023-05-05 PROBLEM — R11.0 NAUSEA: Status: ACTIVE | Noted: 2023-05-05

## 2023-05-05 NOTE — ASSESSMENT
[FreeTextEntry1] : Bariatric surgery history: none\par Overweight / obesity comorbidities: vanna t2dm\par Current anti-obesity medications: semaglutide\par Obesity medication side effects: nausea\par \par -nutrition discussed again at length, SVF\par -zofran for nausea\par -fasting therapy reviewed again\par -continue regular follow ups\par

## 2023-05-05 NOTE — HISTORY OF PRESENT ILLNESS
[Home] : at home, [unfilled] , at the time of the visit. [Other Location: e.g. Home (Enter Location, City,State)___] : at [unfilled] [Verbal consent obtained from patient] : the patient, [unfilled] [FreeTextEntry1] : Patient presents for weight loss and overweight/obese comorbidity management\par \par weight stable\par has returned from trip\par ozempic is not having much effect\par mild nausea\par has had 5 doses of 0.25 mg\par will be increasing to 0.5 mg\par has reviewed materials\par wants to prioritize activity and some gradual dietary changes\par \par Due to comorbidities this patient requires medical treatment for overweight / obesity\par

## 2023-05-16 ENCOUNTER — OUTPATIENT (OUTPATIENT)
Dept: OUTPATIENT SERVICES | Facility: HOSPITAL | Age: 63
LOS: 1 days | Discharge: HOME | End: 2023-05-16
Payer: COMMERCIAL

## 2023-05-16 ENCOUNTER — TRANSCRIPTION ENCOUNTER (OUTPATIENT)
Age: 63
End: 2023-05-16

## 2023-05-16 VITALS
TEMPERATURE: 99 F | WEIGHT: 293 LBS | RESPIRATION RATE: 16 BRPM | HEIGHT: 64 IN | DIASTOLIC BLOOD PRESSURE: 81 MMHG | SYSTOLIC BLOOD PRESSURE: 179 MMHG | HEART RATE: 83 BPM | OXYGEN SATURATION: 99 %

## 2023-05-16 VITALS
DIASTOLIC BLOOD PRESSURE: 76 MMHG | RESPIRATION RATE: 16 BRPM | SYSTOLIC BLOOD PRESSURE: 180 MMHG | HEART RATE: 63 BPM | OXYGEN SATURATION: 96 %

## 2023-05-16 DIAGNOSIS — N95.0 POSTMENOPAUSAL BLEEDING: Chronic | ICD-10-CM

## 2023-05-16 DIAGNOSIS — Z98.890 OTHER SPECIFIED POSTPROCEDURAL STATES: Chronic | ICD-10-CM

## 2023-05-16 DIAGNOSIS — I10 ESSENTIAL (PRIMARY) HYPERTENSION: ICD-10-CM

## 2023-05-16 PROCEDURE — 88312 SPECIAL STAINS GROUP 1: CPT | Mod: 26

## 2023-05-16 PROCEDURE — 88305 TISSUE EXAM BY PATHOLOGIST: CPT | Mod: 26

## 2023-05-16 RX ORDER — METFORMIN HYDROCHLORIDE 850 MG/1
1 TABLET ORAL
Qty: 0 | Refills: 0 | DISCHARGE

## 2023-05-16 RX ORDER — SODIUM CHLORIDE 9 MG/ML
3 INJECTION INTRAMUSCULAR; INTRAVENOUS; SUBCUTANEOUS EVERY 8 HOURS
Refills: 0 | Status: DISCONTINUED | OUTPATIENT
Start: 2023-05-16 | End: 2023-05-30

## 2023-05-16 RX ORDER — SODIUM CHLORIDE 9 MG/ML
1000 INJECTION, SOLUTION INTRAVENOUS
Refills: 0 | Status: DISCONTINUED | OUTPATIENT
Start: 2023-05-16 | End: 2023-05-16

## 2023-05-16 RX ADMIN — SODIUM CHLORIDE 100 MILLILITER(S): 9 INJECTION, SOLUTION INTRAVENOUS at 16:00

## 2023-05-16 NOTE — ASU PREOP CHECKLIST - DENTURES
Problem: Falls - Risk of  Goal: *Absence of Falls  Document Kay[place] Fall Risk and appropriate interventions in the flowsheet.   Outcome: Progressing Towards Goal  Fall Risk Interventions:  Mobility Interventions: Bed/chair exit alarm, OT consult for ADLs, Patient to call before getting OOB, PT Consult for mobility concerns, PT Consult for assist device competence, Utilize walker, cane, or other assistive device         Medication Interventions: Bed/chair exit alarm, Patient to call before getting OOB, Teach patient to arise slowly no

## 2023-05-16 NOTE — ASU DISCHARGE PLAN (ADULT/PEDIATRIC) - NS MD DC FALL RISK RISK
For information on Fall & Injury Prevention, visit: [email or web address] OR  [email or web address] OR  https://www.cdc.gov/steadi/patient.html

## 2023-05-16 NOTE — ASU PATIENT PROFILE, ADULT - FALL HARM RISK - UNIVERSAL INTERVENTIONS
Bed in lowest position, wheels locked, appropriate side rails in place/Call bell, personal items and telephone in reach/Instruct patient to call for assistance before getting out of bed or chair/Non-slip footwear when patient is out of bed/[place] to call system/Physically safe environment - no spills, clutter or unnecessary equipment/Purposeful Proactive Rounding/Room/bathroom lighting operational, light cord in reach

## 2023-05-16 NOTE — ASU DISCHARGE PLAN (ADULT/PEDIATRIC) - ASU DC SPECIAL INSTRUCTIONSFT
Call  if you have rectal bleeding, severe abdominal pain, black stool, abdominal and or severe bloating.

## 2023-05-18 LAB — SURGICAL PATHOLOGY STUDY: SIGNIFICANT CHANGE UP

## 2023-05-19 DIAGNOSIS — K64.8 OTHER HEMORRHOIDS: ICD-10-CM

## 2023-05-19 DIAGNOSIS — D50.9 IRON DEFICIENCY ANEMIA, UNSPECIFIED: ICD-10-CM

## 2023-05-19 DIAGNOSIS — E11.9 TYPE 2 DIABETES MELLITUS WITHOUT COMPLICATIONS: ICD-10-CM

## 2023-05-19 DIAGNOSIS — I10 ESSENTIAL (PRIMARY) HYPERTENSION: ICD-10-CM

## 2023-05-19 DIAGNOSIS — Z79.84 LONG TERM (CURRENT) USE OF ORAL HYPOGLYCEMIC DRUGS: ICD-10-CM

## 2023-05-19 DIAGNOSIS — E66.9 OBESITY, UNSPECIFIED: ICD-10-CM

## 2023-05-19 DIAGNOSIS — K29.50 UNSPECIFIED CHRONIC GASTRITIS WITHOUT BLEEDING: ICD-10-CM

## 2023-05-19 DIAGNOSIS — Z90.710 ACQUIRED ABSENCE OF BOTH CERVIX AND UTERUS: ICD-10-CM

## 2023-05-19 DIAGNOSIS — K57.30 DIVERTICULOSIS OF LARGE INTESTINE WITHOUT PERFORATION OR ABSCESS WITHOUT BLEEDING: ICD-10-CM

## 2023-06-08 ENCOUNTER — APPOINTMENT (OUTPATIENT)
Dept: BARIATRICS/WEIGHT MGMT | Facility: CLINIC | Age: 63
End: 2023-06-08
Payer: COMMERCIAL

## 2023-06-08 VITALS — WEIGHT: 293 LBS | BODY MASS INDEX: 54.41 KG/M2

## 2023-06-08 PROCEDURE — 99214 OFFICE O/P EST MOD 30 MIN: CPT | Mod: 95

## 2023-06-09 NOTE — HISTORY OF PRESENT ILLNESS
[Home] : at home, [unfilled] , at the time of the visit. [Other Location: e.g. Home (Enter Location, City,State)___] : at [unfilled] [Verbal consent obtained from patient] : the patient, [unfilled] [FreeTextEntry1] : Patient presents for weight loss and overweight/obese comorbidity management\par \par doing v well has worked on dietary changes\par tolerating ozempic, not having much effect\par however is cautious about increasing d/t side effects\par has had some feedback from dietary changes, feels better on starches/vegetables\par feels inflammation and swelling after eating ABF\par is pleased and happy with changes\par wants to incr ozempic\par is aiming to add activity exercise bike\par \par Due to comorbidities this patient requires medical treatment for overweight / obesity\par

## 2023-06-09 NOTE — ASSESSMENT
[FreeTextEntry1] : Bariatric surgery history: none\par Overweight / obesity comorbidities: t2dm vanna\par Current anti-obesity medications: ozempic \par Obesity medication side effects: none\par \par -continue excellent lifestyle changes\par -incr ozempic to 0.75 mg, instructions given\par -new educational resources provided\par -cont 1 month follow ups\par  Pam

## 2023-06-12 RX ORDER — ONDANSETRON 4 MG/1
4 TABLET ORAL
Qty: 21 | Refills: 5 | Status: ACTIVE | COMMUNITY
Start: 2023-05-05 | End: 1900-01-01

## 2023-07-06 ENCOUNTER — APPOINTMENT (OUTPATIENT)
Dept: BARIATRICS/WEIGHT MGMT | Facility: CLINIC | Age: 63
End: 2023-07-06
Payer: COMMERCIAL

## 2023-07-06 PROCEDURE — 99214 OFFICE O/P EST MOD 30 MIN: CPT | Mod: 95

## 2023-07-06 NOTE — ASSESSMENT
[FreeTextEntry1] : Bariatric surgery history: none\par Overweight / obesity comorbidities: hld t2dm\par Current anti-obesity medications: ozempic\par Obesity medication side effects: none\par \par incr wegovy to 1 mg\par new cooking and recipe resources provided\par encouraged take time with changes and not to be too hard on self\par maintain activity while on vacation to counteract effects of food environment\par f/u in 4 - 6 weeks\par

## 2023-07-06 NOTE — HISTORY OF PRESENT ILLNESS
[Home] : at home, [unfilled] , at the time of the visit. [Other Location: e.g. Home (Enter Location, City,State)___] : at [unfilled] [Verbal consent obtained from patient] : the patient, [unfilled] [FreeTextEntry1] : Patient presents for weight loss and overweight/obese comorbidity management\par \par stevo reports continued progress\par is ready to go up on ozempic\par dietary changes has been back and forth but moving in the right direction\par has a few vacations and trips coming up and is determined to stay on track\par would like to keep regular monthly follow ups for now\par \par Due to comorbidities this patient requires medical treatment for overweight / obesity\par

## 2023-08-31 ENCOUNTER — APPOINTMENT (OUTPATIENT)
Dept: BARIATRICS/WEIGHT MGMT | Facility: CLINIC | Age: 63
End: 2023-08-31

## 2023-09-06 ENCOUNTER — APPOINTMENT (OUTPATIENT)
Dept: BARIATRICS/WEIGHT MGMT | Facility: CLINIC | Age: 63
End: 2023-09-06
Payer: COMMERCIAL

## 2023-09-06 ENCOUNTER — OUTPATIENT (OUTPATIENT)
Dept: OUTPATIENT SERVICES | Facility: HOSPITAL | Age: 63
LOS: 1 days | End: 2023-09-06
Payer: COMMERCIAL

## 2023-09-06 DIAGNOSIS — Z98.890 OTHER SPECIFIED POSTPROCEDURAL STATES: Chronic | ICD-10-CM

## 2023-09-06 DIAGNOSIS — N95.0 POSTMENOPAUSAL BLEEDING: Chronic | ICD-10-CM

## 2023-09-06 DIAGNOSIS — I10 ESSENTIAL (PRIMARY) HYPERTENSION: ICD-10-CM

## 2023-09-06 PROCEDURE — G0463: CPT

## 2023-09-06 PROCEDURE — 99214 OFFICE O/P EST MOD 30 MIN: CPT | Mod: 95

## 2023-09-06 NOTE — ASSESSMENT
[FreeTextEntry1] : Bariatric surgery history: Overweight / obesity comorbidities: t2dm vanna Current anti-obesity medications: ozempic Obesity medication side effects: none  -questions answered and guidance provided on medications -incr ozempic to 2 mg; kranthi revisit mounjaro in future -continue 1 - 2 mo follow ups

## 2023-09-06 NOTE — HISTORY OF PRESENT ILLNESS
[FreeTextEntry1] : Patient presents for weight loss and overweight/obese comorbidity management  stevo is weight stable has been on various vacations and has a few more upcoming has been around rich food, and partaking, and having drinks with friends will be home to get back on track after another trip in the next couple of weeks has no question would like to incr ozempic to 2 mg and also has questions about mounjaro  Due to comorbidities this patient requires medical treatment for overweight / obesity

## 2023-09-11 DIAGNOSIS — G47.33 OBSTRUCTIVE SLEEP APNEA (ADULT) (PEDIATRIC): ICD-10-CM

## 2023-09-11 DIAGNOSIS — E11.9 TYPE 2 DIABETES MELLITUS WITHOUT COMPLICATIONS: ICD-10-CM

## 2023-09-11 DIAGNOSIS — E66.9 OBESITY, UNSPECIFIED: ICD-10-CM

## 2023-10-12 ENCOUNTER — APPOINTMENT (OUTPATIENT)
Dept: BARIATRICS/WEIGHT MGMT | Facility: CLINIC | Age: 63
End: 2023-10-12
Payer: COMMERCIAL

## 2023-10-12 PROCEDURE — 99214 OFFICE O/P EST MOD 30 MIN: CPT | Mod: 95

## 2023-11-30 ENCOUNTER — APPOINTMENT (OUTPATIENT)
Dept: BARIATRICS/WEIGHT MGMT | Facility: CLINIC | Age: 63
End: 2023-11-30
Payer: COMMERCIAL

## 2023-11-30 ENCOUNTER — OUTPATIENT (OUTPATIENT)
Dept: OUTPATIENT SERVICES | Facility: HOSPITAL | Age: 63
LOS: 1 days | End: 2023-11-30
Payer: COMMERCIAL

## 2023-11-30 DIAGNOSIS — N95.0 POSTMENOPAUSAL BLEEDING: Chronic | ICD-10-CM

## 2023-11-30 DIAGNOSIS — Z98.890 OTHER SPECIFIED POSTPROCEDURAL STATES: Chronic | ICD-10-CM

## 2023-11-30 DIAGNOSIS — I10 ESSENTIAL (PRIMARY) HYPERTENSION: ICD-10-CM

## 2023-11-30 PROCEDURE — 99214 OFFICE O/P EST MOD 30 MIN: CPT | Mod: 95

## 2023-11-30 PROCEDURE — G0463: CPT

## 2023-12-14 DIAGNOSIS — E66.01 MORBID (SEVERE) OBESITY DUE TO EXCESS CALORIES: ICD-10-CM

## 2023-12-14 DIAGNOSIS — G47.33 OBSTRUCTIVE SLEEP APNEA (ADULT) (PEDIATRIC): ICD-10-CM

## 2023-12-14 DIAGNOSIS — E11.9 TYPE 2 DIABETES MELLITUS WITHOUT COMPLICATIONS: ICD-10-CM

## 2024-01-04 ENCOUNTER — OUTPATIENT (OUTPATIENT)
Dept: OUTPATIENT SERVICES | Facility: HOSPITAL | Age: 64
LOS: 1 days | End: 2024-01-04
Payer: COMMERCIAL

## 2024-01-04 ENCOUNTER — APPOINTMENT (OUTPATIENT)
Dept: BARIATRICS/WEIGHT MGMT | Facility: CLINIC | Age: 64
End: 2024-01-04
Payer: COMMERCIAL

## 2024-01-04 DIAGNOSIS — G47.33 OBSTRUCTIVE SLEEP APNEA (ADULT) (PEDIATRIC): ICD-10-CM

## 2024-01-04 DIAGNOSIS — Z98.890 OTHER SPECIFIED POSTPROCEDURAL STATES: Chronic | ICD-10-CM

## 2024-01-04 DIAGNOSIS — E11.9 TYPE 2 DIABETES MELLITUS WITHOUT COMPLICATIONS: ICD-10-CM

## 2024-01-04 DIAGNOSIS — N95.0 POSTMENOPAUSAL BLEEDING: Chronic | ICD-10-CM

## 2024-01-04 DIAGNOSIS — I10 ESSENTIAL (PRIMARY) HYPERTENSION: ICD-10-CM

## 2024-01-04 DIAGNOSIS — E66.01 MORBID (SEVERE) OBESITY DUE TO EXCESS CALORIES: ICD-10-CM

## 2024-01-04 PROCEDURE — G0463: CPT

## 2024-01-04 PROCEDURE — 99214 OFFICE O/P EST MOD 30 MIN: CPT | Mod: 95

## 2024-01-04 RX ORDER — TIRZEPATIDE 10 MG/.5ML
10 INJECTION, SOLUTION SUBCUTANEOUS
Qty: 3 | Refills: 1 | Status: DISCONTINUED | COMMUNITY
Start: 2024-01-04 | End: 2024-01-04

## 2024-01-04 RX ORDER — ORAL SEMAGLUTIDE 14 MG/1
14 TABLET ORAL
Qty: 30 | Refills: 5 | Status: DISCONTINUED | COMMUNITY
Start: 2023-10-31 | End: 2024-01-04

## 2024-01-04 RX ORDER — TIRZEPATIDE 7.5 MG/.5ML
7.5 INJECTION, SOLUTION SUBCUTANEOUS
Qty: 1 | Refills: 5 | Status: DISCONTINUED | COMMUNITY
Start: 2023-10-25 | End: 2024-01-04

## 2024-01-04 NOTE — ASSESSMENT
[FreeTextEntry1] : Bariatric surgery history: none Overweight / obesity comorbidities: t2dm vanna Current anti-obesity medications: mounjaro Obesity medication side effects: none  -reviewed key nutrition principles, discussed habit change -discussed meds at length, and came to the following plan -cont to gradually incr mounjaro, now incr to 7.5 mg

## 2024-01-04 NOTE — HISTORY OF PRESENT ILLNESS
[Home] : at home, [unfilled] , at the time of the visit. [Other Location: e.g. Home (Enter Location, City,State)___] : at [unfilled] [Verbal consent obtained from patient] : the patient, [unfilled] [FreeTextEntry1] : Patient presents for weight loss and overweight/obese comorbidity management  doing well, continued to lose weight over past few months then plateauing and incr appetite after holidays mounjaro helping but effect waning eating a very rich omnivorous diet with etoh and sweets over holiday and not yet on track with food however had prior success with rice/vegetable meals incorporated that pushed WL in november body feels better less pain, less inflammation with those foods  Due to comorbidities this patient requires medical treatment for overweight / obesity

## 2024-02-08 ENCOUNTER — APPOINTMENT (OUTPATIENT)
Dept: BARIATRICS/WEIGHT MGMT | Facility: CLINIC | Age: 64
End: 2024-02-08
Payer: COMMERCIAL

## 2024-02-08 ENCOUNTER — OUTPATIENT (OUTPATIENT)
Dept: OUTPATIENT SERVICES | Facility: HOSPITAL | Age: 64
LOS: 1 days | End: 2024-02-08
Payer: COMMERCIAL

## 2024-02-08 DIAGNOSIS — Z98.890 OTHER SPECIFIED POSTPROCEDURAL STATES: Chronic | ICD-10-CM

## 2024-02-08 DIAGNOSIS — I10 ESSENTIAL (PRIMARY) HYPERTENSION: ICD-10-CM

## 2024-02-08 DIAGNOSIS — N95.0 POSTMENOPAUSAL BLEEDING: Chronic | ICD-10-CM

## 2024-02-08 PROCEDURE — G0463: CPT

## 2024-02-08 PROCEDURE — 99212 OFFICE O/P EST SF 10 MIN: CPT | Mod: 95

## 2024-02-08 NOTE — HISTORY OF PRESENT ILLNESS
[Home] : at home, [unfilled] , at the time of the visit. [Other Location: e.g. Home (Enter Location, City,State)___] : at [unfilled] [Verbal consent obtained from patient] : the patient, [unfilled] [FreeTextEntry1] : Patient presents for weight loss and overweight/obese comorbidity management  doing well, mounjaro helping maintaining other changes has had some cardiology follow ups for abnormal valve seen on CT  Due to comorbidities this patient requires medical treatment for overweight / obesity

## 2024-02-08 NOTE — ASSESSMENT
[FreeTextEntry1] : Bariatric surgery history: none Overweight / obesity comorbidities: t2dm hld htn Current anti-obesity medications: mounjaro  Obesity medication side effects: none  -cont mounjaro 7.5 mg -cont follow ups 1 - 2 mo

## 2024-02-16 ENCOUNTER — OUTPATIENT (OUTPATIENT)
Dept: OUTPATIENT SERVICES | Facility: HOSPITAL | Age: 64
LOS: 1 days | Discharge: HOME | End: 2024-02-16
Payer: COMMERCIAL

## 2024-02-16 VITALS — WEIGHT: 293 LBS | HEIGHT: 64 IN

## 2024-02-16 DIAGNOSIS — Z98.890 OTHER SPECIFIED POSTPROCEDURAL STATES: Chronic | ICD-10-CM

## 2024-02-16 DIAGNOSIS — Z90.710 ACQUIRED ABSENCE OF BOTH CERVIX AND UTERUS: Chronic | ICD-10-CM

## 2024-02-16 DIAGNOSIS — N95.0 POSTMENOPAUSAL BLEEDING: Chronic | ICD-10-CM

## 2024-02-16 DIAGNOSIS — R94.39 ABNORMAL RESULT OF OTHER CARDIOVASCULAR FUNCTION STUDY: ICD-10-CM

## 2024-02-16 LAB
ALBUMIN SERPL ELPH-MCNC: 3.9 G/DL — SIGNIFICANT CHANGE UP (ref 3.3–5)
ALP SERPL-CCNC: 96 U/L — SIGNIFICANT CHANGE UP (ref 40–120)
ALT FLD-CCNC: 12 U/L — SIGNIFICANT CHANGE UP (ref 4–33)
ANION GAP SERPL CALC-SCNC: 11 MMOL/L — SIGNIFICANT CHANGE UP (ref 7–14)
AST SERPL-CCNC: 32 U/L — SIGNIFICANT CHANGE UP (ref 4–32)
BILIRUB SERPL-MCNC: 0.6 MG/DL — SIGNIFICANT CHANGE UP (ref 0.2–1.2)
BUN SERPL-MCNC: 12 MG/DL — SIGNIFICANT CHANGE UP (ref 7–23)
CALCIUM SERPL-MCNC: 9.2 MG/DL — SIGNIFICANT CHANGE UP (ref 8.4–10.5)
CHLORIDE SERPL-SCNC: 100 MMOL/L — SIGNIFICANT CHANGE UP (ref 98–107)
CO2 SERPL-SCNC: 27 MMOL/L — SIGNIFICANT CHANGE UP (ref 22–31)
CREAT SERPL-MCNC: 0.78 MG/DL — SIGNIFICANT CHANGE UP (ref 0.5–1.3)
EGFR: 85 ML/MIN/1.73M2 — SIGNIFICANT CHANGE UP
GLUCOSE SERPL-MCNC: 99 MG/DL — SIGNIFICANT CHANGE UP (ref 70–99)
HCT VFR BLD CALC: 36.4 % — SIGNIFICANT CHANGE UP (ref 34.5–45)
HGB BLD-MCNC: 11.2 G/DL — LOW (ref 11.5–15.5)
MAGNESIUM SERPL-MCNC: 1.9 MG/DL — SIGNIFICANT CHANGE UP (ref 1.6–2.6)
MCHC RBC-ENTMCNC: 25.4 PG — LOW (ref 27–34)
MCHC RBC-ENTMCNC: 30.8 GM/DL — LOW (ref 32–36)
MCV RBC AUTO: 82.5 FL — SIGNIFICANT CHANGE UP (ref 80–100)
NRBC # BLD: 0 /100 WBCS — SIGNIFICANT CHANGE UP (ref 0–0)
NRBC # FLD: 0 K/UL — SIGNIFICANT CHANGE UP (ref 0–0)
PHOSPHATE SERPL-MCNC: 3.2 MG/DL — SIGNIFICANT CHANGE UP (ref 2.5–4.5)
PLATELET # BLD AUTO: 226 K/UL — SIGNIFICANT CHANGE UP (ref 150–400)
POTASSIUM SERPL-MCNC: 5 MMOL/L — SIGNIFICANT CHANGE UP (ref 3.5–5.3)
POTASSIUM SERPL-SCNC: 5 MMOL/L — SIGNIFICANT CHANGE UP (ref 3.5–5.3)
PROT SERPL-MCNC: 8.3 G/DL — SIGNIFICANT CHANGE UP (ref 6–8.3)
RBC # BLD: 4.41 M/UL — SIGNIFICANT CHANGE UP (ref 3.8–5.2)
RBC # FLD: 15.1 % — HIGH (ref 10.3–14.5)
SODIUM SERPL-SCNC: 138 MMOL/L — SIGNIFICANT CHANGE UP (ref 135–145)
WBC # BLD: 7.9 K/UL — SIGNIFICANT CHANGE UP (ref 3.8–10.5)
WBC # FLD AUTO: 7.9 K/UL — SIGNIFICANT CHANGE UP (ref 3.8–10.5)

## 2024-02-16 PROCEDURE — 99152 MOD SED SAME PHYS/QHP 5/>YRS: CPT

## 2024-02-16 PROCEDURE — 93010 ELECTROCARDIOGRAM REPORT: CPT

## 2024-02-16 PROCEDURE — 93454 CORONARY ARTERY ANGIO S&I: CPT | Mod: 26

## 2024-02-16 RX ORDER — SODIUM CHLORIDE 9 MG/ML
3 INJECTION INTRAMUSCULAR; INTRAVENOUS; SUBCUTANEOUS EVERY 8 HOURS
Refills: 0 | Status: DISCONTINUED | OUTPATIENT
Start: 2024-02-16 | End: 2024-03-01

## 2024-02-16 RX ORDER — TIRZEPATIDE 15 MG/.5ML
7.5 INJECTION, SOLUTION SUBCUTANEOUS
Refills: 0 | DISCHARGE

## 2024-02-16 NOTE — H&P CARDIOLOGY - HISTORY OF PRESENT ILLNESS
64 y/o obese female with a PMHx of HTN, HLD (diet controlled), DM, and SARAH (mouthguard) presents for elective cardiac catheterization. Pt has been following with a cardiologist regularly for management of blood pressure and risk factors. Pt recently switched her cardiologist to Dr. Jose Harrington at the referral of her mother. Pt saw Dr. Harrington for a routine appointment and underwent a nuclear stress test, which was abnormal (no results sent). Pt denies any symptoms, including fever, chills, recent travel, headache, dizziness, visual deficits, chest pain, shortness of breath, orthopnea, palpitations, abdominal pain, N/V/D/C, hematochezia, melena, dysuria, hematuria, LOC, syncope, peripheral edema. In light of patients cardiac risk factors and abnormal noninvasive test findings, there is suspicion for CAD. Patient is now referred to [facility] for a cardiac catheterization with possible PTCA/stent.      Cardiologist: Dr. Jose Harrington

## 2024-02-16 NOTE — H&P CARDIOLOGY - NSICDXPASTMEDICALHX_GEN_ALL_CORE_FT
PAST MEDICAL HISTORY:  DM (diabetes mellitus)     HLD (hyperlipidemia)     HTN (hypertension)     Obesity     SARAH (obstructive sleep apnea)

## 2024-02-22 DIAGNOSIS — E66.01 MORBID (SEVERE) OBESITY DUE TO EXCESS CALORIES: ICD-10-CM

## 2024-02-22 DIAGNOSIS — E11.9 TYPE 2 DIABETES MELLITUS WITHOUT COMPLICATIONS: ICD-10-CM

## 2024-02-22 DIAGNOSIS — G47.33 OBSTRUCTIVE SLEEP APNEA (ADULT) (PEDIATRIC): ICD-10-CM

## 2024-03-21 ENCOUNTER — OUTPATIENT (OUTPATIENT)
Dept: OUTPATIENT SERVICES | Facility: HOSPITAL | Age: 64
LOS: 1 days | End: 2024-03-21
Payer: COMMERCIAL

## 2024-03-21 ENCOUNTER — APPOINTMENT (OUTPATIENT)
Dept: BARIATRICS/WEIGHT MGMT | Facility: CLINIC | Age: 64
End: 2024-03-21
Payer: COMMERCIAL

## 2024-03-21 VITALS — WEIGHT: 293 LBS | BODY MASS INDEX: 53.38 KG/M2

## 2024-03-21 DIAGNOSIS — Z98.890 OTHER SPECIFIED POSTPROCEDURAL STATES: Chronic | ICD-10-CM

## 2024-03-21 DIAGNOSIS — I10 ESSENTIAL (PRIMARY) HYPERTENSION: ICD-10-CM

## 2024-03-21 DIAGNOSIS — Z90.710 ACQUIRED ABSENCE OF BOTH CERVIX AND UTERUS: Chronic | ICD-10-CM

## 2024-03-21 PROBLEM — G47.33 OBSTRUCTIVE SLEEP APNEA (ADULT) (PEDIATRIC): Chronic | Status: ACTIVE | Noted: 2024-02-16

## 2024-03-21 PROBLEM — E11.9 TYPE 2 DIABETES MELLITUS WITHOUT COMPLICATIONS: Chronic | Status: ACTIVE | Noted: 2024-02-16

## 2024-03-21 PROBLEM — E78.5 HYPERLIPIDEMIA, UNSPECIFIED: Chronic | Status: ACTIVE | Noted: 2024-02-16

## 2024-03-21 PROCEDURE — G0463: CPT

## 2024-03-21 PROCEDURE — 99214 OFFICE O/P EST MOD 30 MIN: CPT | Mod: 95

## 2024-03-21 RX ORDER — SEMAGLUTIDE 2.68 MG/ML
8 INJECTION, SOLUTION SUBCUTANEOUS
Qty: 1 | Refills: 5 | Status: DISCONTINUED | COMMUNITY
Start: 2023-03-30 | End: 2024-03-21

## 2024-03-21 NOTE — HISTORY OF PRESENT ILLNESS
[Home] : at home, [unfilled] , at the time of the visit. [Other Location: e.g. Home (Enter Location, City,State)___] : at [unfilled] [Verbal consent obtained from patient] : the patient, [unfilled] [FreeTextEntry1] : Patient presents for weight loss and overweight/obese comorbidity management  doing well, has lost 25 lbs so far since last year mounjaro 7.5 mg tolerated, would like to incr endocrinologist suggested increase otherwise maintaining stable diet  Due to comorbidities this patient requires medical treatment for overweight / obesity

## 2024-03-21 NOTE — ASSESSMENT
[FreeTextEntry1] : Bariatric surgery history: none Overweight / obesity comorbidities: t2dm vanna Current anti-obesity medications: mounjaro Obesity medication side effects: none  incr mounjaro to 12.5, she has tolerated all doses v well space out follow ups to 3 months

## 2024-03-26 DIAGNOSIS — E66.01 MORBID (SEVERE) OBESITY DUE TO EXCESS CALORIES: ICD-10-CM

## 2024-03-26 DIAGNOSIS — G47.33 OBSTRUCTIVE SLEEP APNEA (ADULT) (PEDIATRIC): ICD-10-CM

## 2024-03-26 DIAGNOSIS — E11.9 TYPE 2 DIABETES MELLITUS WITHOUT COMPLICATIONS: ICD-10-CM

## 2024-05-14 RX ORDER — SEMAGLUTIDE 2.68 MG/ML
8 INJECTION, SOLUTION SUBCUTANEOUS
Qty: 2 | Refills: 1 | Status: ACTIVE | COMMUNITY
Start: 2024-05-14 | End: 1900-01-01

## 2024-06-06 ENCOUNTER — APPOINTMENT (OUTPATIENT)
Dept: BARIATRICS/WEIGHT MGMT | Facility: CLINIC | Age: 64
End: 2024-06-06
Payer: COMMERCIAL

## 2024-06-06 DIAGNOSIS — E66.01 MORBID (SEVERE) OBESITY DUE TO EXCESS CALORIES: ICD-10-CM

## 2024-06-06 DIAGNOSIS — E11.9 TYPE 2 DIABETES MELLITUS W/OUT COMPLICATIONS: ICD-10-CM

## 2024-06-06 DIAGNOSIS — G47.33 OBSTRUCTIVE SLEEP APNEA (ADULT) (PEDIATRIC): ICD-10-CM

## 2024-06-06 PROCEDURE — 99213 OFFICE O/P EST LOW 20 MIN: CPT | Mod: 95

## 2024-06-06 RX ORDER — TIRZEPATIDE 12.5 MG/.5ML
12.5 INJECTION, SOLUTION SUBCUTANEOUS
Qty: 1 | Refills: 5 | Status: ACTIVE | COMMUNITY
Start: 2024-01-04 | End: 1900-01-01

## 2024-06-07 PROBLEM — E66.01 CLASS 3 OBESITY: Status: ACTIVE | Noted: 2023-03-30

## 2024-06-07 PROBLEM — E11.9 TYPE 2 DIABETES MELLITUS: Status: ACTIVE | Noted: 2023-03-30

## 2024-06-07 PROBLEM — G47.33 OSA (OBSTRUCTIVE SLEEP APNEA): Status: ACTIVE | Noted: 2022-06-22

## 2024-06-07 NOTE — HISTORY OF PRESENT ILLNESS
[Home] : at home, [unfilled] , at the time of the visit. [Other Location: e.g. Home (Enter Location, City,State)___] : at [unfilled] [Verbal consent obtained from patient] : the patient, [unfilled] [FreeTextEntry1] : Patient presents for weight loss and overweight/obese comorbidity management  doing well on mounjaro has a few medication questions  Due to comorbidities this patient requires medical treatment for overweight / obesity

## 2024-08-28 ENCOUNTER — APPOINTMENT (OUTPATIENT)
Dept: BARIATRICS/WEIGHT MGMT | Facility: CLINIC | Age: 64
End: 2024-08-28
Payer: COMMERCIAL

## 2024-08-28 DIAGNOSIS — E66.01 MORBID (SEVERE) OBESITY DUE TO EXCESS CALORIES: ICD-10-CM

## 2024-08-28 DIAGNOSIS — G47.33 OBSTRUCTIVE SLEEP APNEA (ADULT) (PEDIATRIC): ICD-10-CM

## 2024-08-28 DIAGNOSIS — E11.9 TYPE 2 DIABETES MELLITUS W/OUT COMPLICATIONS: ICD-10-CM

## 2024-08-28 PROCEDURE — 99214 OFFICE O/P EST MOD 30 MIN: CPT

## 2024-08-28 RX ORDER — TIRZEPATIDE 15 MG/.5ML
15 INJECTION, SOLUTION SUBCUTANEOUS
Qty: 6 | Refills: 1 | Status: ACTIVE | COMMUNITY
Start: 2024-08-28 | End: 1900-01-01

## 2024-08-28 NOTE — HISTORY OF PRESENT ILLNESS
[Home] : at home, [unfilled] , at the time of the visit. [Other Location: e.g. Home (Enter Location, City,State)___] : at [unfilled] [Verbal consent obtained from patient] : the patient, [unfilled] [FreeTextEntry1] : Patient presents for weight loss and overweight/obese comorbidity management  doing well overall has been busy, travelling lifestyle changes, focus is exercise,  5x weekly making intermittent efforts with diet has had trouble getting mounjaro

## 2024-08-28 NOTE — ASSESSMENT
[FreeTextEntry1] : Bariatric surgery history: none Overweight / obesity comorbidities: t2dm vanna Current anti-obesity medications: mounjaro Obesity medication side effects: none  key nutrition concepts reviewed again, resources shared incr mounjaro to 15 mg follow up in 3 mo

## 2024-10-08 RX ORDER — TIRZEPATIDE 15 MG/.5ML
15 INJECTION, SOLUTION SUBCUTANEOUS WEEKLY
Qty: 3 | Refills: 1 | Status: ACTIVE | COMMUNITY
Start: 2024-10-08 | End: 1900-01-01

## 2024-11-20 ENCOUNTER — APPOINTMENT (OUTPATIENT)
Dept: BARIATRICS/WEIGHT MGMT | Facility: CLINIC | Age: 64
End: 2024-11-20

## 2024-11-20 ENCOUNTER — OUTPATIENT (OUTPATIENT)
Dept: OUTPATIENT SERVICES | Facility: HOSPITAL | Age: 64
LOS: 1 days | End: 2024-11-20
Payer: COMMERCIAL

## 2024-11-20 DIAGNOSIS — I10 ESSENTIAL (PRIMARY) HYPERTENSION: ICD-10-CM

## 2024-11-20 DIAGNOSIS — E66.813 OBESITY, CLASS 3: ICD-10-CM

## 2024-11-20 DIAGNOSIS — E11.9 TYPE 2 DIABETES MELLITUS W/OUT COMPLICATIONS: ICD-10-CM

## 2024-11-20 DIAGNOSIS — Z98.890 OTHER SPECIFIED POSTPROCEDURAL STATES: Chronic | ICD-10-CM

## 2024-11-20 DIAGNOSIS — G47.33 OBSTRUCTIVE SLEEP APNEA (ADULT) (PEDIATRIC): ICD-10-CM

## 2024-11-20 DIAGNOSIS — Z90.710 ACQUIRED ABSENCE OF BOTH CERVIX AND UTERUS: Chronic | ICD-10-CM

## 2024-11-20 PROCEDURE — 99204 OFFICE O/P NEW MOD 45 MIN: CPT | Mod: 95

## 2024-11-20 PROCEDURE — 99214 OFFICE O/P EST MOD 30 MIN: CPT | Mod: 95

## 2024-11-20 PROCEDURE — G2211 COMPLEX E/M VISIT ADD ON: CPT | Mod: 95

## 2024-11-20 PROCEDURE — G0463: CPT

## 2024-11-21 PROBLEM — E66.813 CLASS 3 OBESITY: Status: ACTIVE | Noted: 2023-03-30

## 2024-11-21 RX ORDER — PHENTERMINE HYDROCHLORIDE 37.5 MG/1
37.5 TABLET ORAL
Qty: 15 | Refills: 5 | Status: ACTIVE | COMMUNITY
Start: 2024-11-21 | End: 1900-01-01

## 2024-11-21 RX ORDER — TOPIRAMATE 25 MG/1
25 TABLET, FILM COATED ORAL
Qty: 30 | Refills: 5 | Status: ACTIVE | COMMUNITY
Start: 2024-11-21 | End: 1900-01-01

## 2024-12-02 DIAGNOSIS — E66.813 OBESITY, CLASS 3: ICD-10-CM

## 2024-12-02 DIAGNOSIS — E11.9 TYPE 2 DIABETES MELLITUS WITHOUT COMPLICATIONS: ICD-10-CM

## 2024-12-02 DIAGNOSIS — G47.33 OBSTRUCTIVE SLEEP APNEA (ADULT) (PEDIATRIC): ICD-10-CM

## 2025-03-19 ENCOUNTER — OUTPATIENT (OUTPATIENT)
Dept: OUTPATIENT SERVICES | Facility: HOSPITAL | Age: 65
LOS: 1 days | End: 2025-03-19

## 2025-03-19 ENCOUNTER — APPOINTMENT (OUTPATIENT)
Dept: BARIATRICS/WEIGHT MGMT | Facility: CLINIC | Age: 65
End: 2025-03-19

## 2025-03-19 VITALS — BODY MASS INDEX: 51.15 KG/M2 | WEIGHT: 293 LBS

## 2025-03-19 DIAGNOSIS — E66.813 OBESITY, CLASS 3: ICD-10-CM

## 2025-03-19 DIAGNOSIS — Z90.710 ACQUIRED ABSENCE OF BOTH CERVIX AND UTERUS: Chronic | ICD-10-CM

## 2025-03-19 DIAGNOSIS — G47.33 OBSTRUCTIVE SLEEP APNEA (ADULT) (PEDIATRIC): ICD-10-CM

## 2025-03-19 DIAGNOSIS — E11.9 TYPE 2 DIABETES MELLITUS W/OUT COMPLICATIONS: ICD-10-CM

## 2025-03-19 DIAGNOSIS — Z98.890 OTHER SPECIFIED POSTPROCEDURAL STATES: Chronic | ICD-10-CM

## 2025-03-19 PROCEDURE — 99214 OFFICE O/P EST MOD 30 MIN: CPT | Mod: 95

## 2025-03-20 DIAGNOSIS — I10 ESSENTIAL (PRIMARY) HYPERTENSION: ICD-10-CM

## 2025-03-25 DIAGNOSIS — G47.33 OBSTRUCTIVE SLEEP APNEA (ADULT) (PEDIATRIC): ICD-10-CM

## 2025-03-25 DIAGNOSIS — E66.813 OBESITY, CLASS 3: ICD-10-CM

## 2025-03-25 DIAGNOSIS — E11.9 TYPE 2 DIABETES MELLITUS WITHOUT COMPLICATIONS: ICD-10-CM

## 2025-05-29 NOTE — H&P PST ADULT - NS NEC GEN PE MLT EXAM PC
Take medication as prescribed   Encourage albuterol inhaler Q4-6 hrs as needed   Encourage antihistamine Zyrtec, allegra, Claritin 5-7 days  Avoid mucus producing foods such as diary     Keep area clean and dry   If symptoms do not improve go to ER   Follow up with PCP in 1 week      detailed exam

## (undated) DEVICE — BITE BLOCK ADULT 20 X 27MM (GREEN)

## (undated) DEVICE — TUBING HYBRID CO2

## (undated) DEVICE — KIT ENDO PROCEDURE CUST W/VLV

## (undated) DEVICE — Device

## (undated) DEVICE — TUBING ERBE CO2 OLYMPUS CONNECTOR

## (undated) DEVICE — GLV 9 PROTEXIS (WHITE)

## (undated) DEVICE — FORCEP RADIAL JAW 4 W NDL 2.4MM 2.8MM 240CM ORANGE DISP